# Patient Record
Sex: MALE | Race: WHITE | NOT HISPANIC OR LATINO | URBAN - METROPOLITAN AREA
[De-identification: names, ages, dates, MRNs, and addresses within clinical notes are randomized per-mention and may not be internally consistent; named-entity substitution may affect disease eponyms.]

---

## 2017-06-08 ENCOUNTER — RESOLUTE PROFESSIONAL BILLING HOSPITAL PROF FEE (OUTPATIENT)
Dept: PHYSICAL MEDICINE AND REHAB | Facility: REHABILITATION | Age: 22
End: 2017-06-08
Payer: COMMERCIAL

## 2017-06-08 ENCOUNTER — HOSPITAL ENCOUNTER (INPATIENT)
Facility: REHABILITATION | Age: 22
LOS: 13 days | DRG: 057 | End: 2017-06-21
Attending: PHYSICAL MEDICINE & REHABILITATION | Admitting: PHYSICAL MEDICINE & REHABILITATION
Payer: COMMERCIAL

## 2017-06-08 PROBLEM — S06.4XAA EPIDURAL HEMATOMA (HCC): Status: ACTIVE | Noted: 2017-06-08

## 2017-06-08 PROCEDURE — 94760 N-INVAS EAR/PLS OXIMETRY 1: CPT

## 2017-06-08 PROCEDURE — 99223 1ST HOSP IP/OBS HIGH 75: CPT | Performed by: PHYSICAL MEDICINE & REHABILITATION

## 2017-06-08 PROCEDURE — 770010 HCHG ROOM/CARE - REHAB SEMI PRIVAT*

## 2017-06-08 PROCEDURE — A9270 NON-COVERED ITEM OR SERVICE: HCPCS | Performed by: PHYSICAL MEDICINE & REHABILITATION

## 2017-06-08 PROCEDURE — 700102 HCHG RX REV CODE 250 W/ 637 OVERRIDE(OP): Performed by: PHYSICAL MEDICINE & REHABILITATION

## 2017-06-08 RX ORDER — ONDANSETRON 2 MG/ML
4 INJECTION INTRAMUSCULAR; INTRAVENOUS 4 TIMES DAILY PRN
Status: DISCONTINUED | OUTPATIENT
Start: 2017-06-08 | End: 2017-06-21 | Stop reason: HOSPADM

## 2017-06-08 RX ORDER — ONDANSETRON 4 MG/1
4 TABLET, ORALLY DISINTEGRATING ORAL 4 TIMES DAILY PRN
Status: DISCONTINUED | OUTPATIENT
Start: 2017-06-08 | End: 2017-06-21 | Stop reason: HOSPADM

## 2017-06-08 RX ORDER — LEVETIRACETAM 500 MG/1
TABLET ORAL
Status: COMPLETED
Start: 2017-06-08 | End: 2017-06-08

## 2017-06-08 RX ORDER — OXYCODONE AND ACETAMINOPHEN 10; 325 MG/1; MG/1
1 TABLET ORAL EVERY 6 HOURS PRN
Status: DISCONTINUED | OUTPATIENT
Start: 2017-06-08 | End: 2017-06-21 | Stop reason: HOSPADM

## 2017-06-08 RX ORDER — LEVETIRACETAM 100 MG/ML
SOLUTION ORAL 2 TIMES DAILY
Status: ON HOLD | COMMUNITY
End: 2017-06-21

## 2017-06-08 RX ORDER — LEVETIRACETAM 500 MG/1
1000 TABLET ORAL EVERY 12 HOURS
Status: DISCONTINUED | OUTPATIENT
Start: 2017-06-08 | End: 2017-06-21 | Stop reason: HOSPADM

## 2017-06-08 RX ORDER — AMOXICILLIN 250 MG
1 CAPSULE ORAL EVERY EVENING
Status: DISCONTINUED | OUTPATIENT
Start: 2017-06-08 | End: 2017-06-21 | Stop reason: HOSPADM

## 2017-06-08 RX ORDER — DIVALPROEX SODIUM 500 MG/1
500 TABLET, DELAYED RELEASE ORAL
Status: DISCONTINUED | OUTPATIENT
Start: 2017-06-08 | End: 2017-06-21 | Stop reason: HOSPADM

## 2017-06-08 RX ORDER — DOCUSATE SODIUM 100 MG/1
100 CAPSULE, LIQUID FILLED ORAL DAILY
Status: DISCONTINUED | OUTPATIENT
Start: 2017-06-08 | End: 2017-06-21 | Stop reason: HOSPADM

## 2017-06-08 RX ORDER — DIVALPROEX SODIUM 125 MG/1
125 CAPSULE, COATED PELLETS ORAL 2 TIMES DAILY
Status: ON HOLD | COMMUNITY
End: 2017-06-21

## 2017-06-08 RX ORDER — ENEMA 19; 7 G/133ML; G/133ML
1 ENEMA RECTAL PRN
Status: DISCONTINUED | OUTPATIENT
Start: 2017-06-08 | End: 2017-06-21 | Stop reason: HOSPADM

## 2017-06-08 RX ORDER — BISACODYL 10 MG
10 SUPPOSITORY, RECTAL RECTAL PRN
Status: DISCONTINUED | OUTPATIENT
Start: 2017-06-08 | End: 2017-06-21 | Stop reason: HOSPADM

## 2017-06-08 RX ORDER — TRAZODONE HYDROCHLORIDE 50 MG/1
50 TABLET ORAL
Status: DISCONTINUED | OUTPATIENT
Start: 2017-06-08 | End: 2017-06-21 | Stop reason: HOSPADM

## 2017-06-08 RX ORDER — ACETAMINOPHEN 325 MG/1
650 TABLET ORAL EVERY 4 HOURS PRN
Status: DISCONTINUED | OUTPATIENT
Start: 2017-06-08 | End: 2017-06-21 | Stop reason: HOSPADM

## 2017-06-08 RX ORDER — LACTULOSE 20 G/30ML
30 SOLUTION ORAL PRN
Status: DISCONTINUED | OUTPATIENT
Start: 2017-06-08 | End: 2017-06-21 | Stop reason: HOSPADM

## 2017-06-08 RX ORDER — SIMETHICONE 80 MG
80 TABLET,CHEWABLE ORAL 3 TIMES DAILY PRN
Status: DISCONTINUED | OUTPATIENT
Start: 2017-06-08 | End: 2017-06-21 | Stop reason: HOSPADM

## 2017-06-08 RX ADMIN — Medication 1 TABLET: at 20:41

## 2017-06-08 RX ADMIN — DIVALPROEX SODIUM 500 MG: 500 TABLET, DELAYED RELEASE ORAL at 20:41

## 2017-06-08 RX ADMIN — OXYCODONE HYDROCHLORIDE AND ACETAMINOPHEN 1 TABLET: 10; 325 TABLET ORAL at 20:45

## 2017-06-08 RX ADMIN — LEVETIRACETAM 1000 MG: 500 TABLET, FILM COATED ORAL at 20:53

## 2017-06-08 RX ADMIN — TRAZODONE HYDROCHLORIDE 50 MG: 50 TABLET ORAL at 20:45

## 2017-06-08 ASSESSMENT — COPD QUESTIONNAIRES
DURING THE PAST 4 WEEKS HOW MUCH DID YOU FEEL SHORT OF BREATH: NONE/LITTLE OF THE TIME
COPD SCREENING SCORE: 0
DO YOU EVER COUGH UP ANY MUCUS OR PHLEGM?: NO/ONLY WITH OCCASIONAL COLDS OR INFECTIONS
HAVE YOU SMOKED AT LEAST 100 CIGARETTES IN YOUR ENTIRE LIFE: NO/DON'T KNOW

## 2017-06-08 ASSESSMENT — PAIN SCALES - GENERAL: PAINLEVEL_OUTOF10: 4

## 2017-06-08 ASSESSMENT — LIFESTYLE VARIABLES
ALCOHOL_USE: NO
EVER_SMOKED: NEVER
EVER_SMOKED: NEVER
DO YOU DRINK ALCOHOL: NO

## 2017-06-08 NOTE — PROGRESS NOTES
1502 Pt arrived at [facility] from [facility] via hospital transport. Dom to follow for diagnosis of s/p epidural hematoma. Initial assessment initiated. Pt oriented to room and facility routine and safety measures; pt education binder provided and discussed. Pt A/O x 4, continent of bowel and bladder. Min assist for transfers. All wounds photographed and documented; photos uploaded to Media Manager. Pt denies pain or discomfort at this time. Pt positioned for comfort in bed. Call light within reach, safety measures in place. Will continue to monitor.
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
## 2017-06-08 NOTE — IP AVS SNAPSHOT
" Home Care Instructions                                                                                                                  Name:Meir Byrd  Medical Record Number:[id number]  CSN: [id number]    YOB: 1995   Age: 21 y.o.  Sex: male  HT:1.689 m (5' 6.5\") WT: 55.2 kg (121 lb 11.1 oz)          Admit Date: 6/8/2017     Discharge Date:   Today's Date: 6/21/2017  Attending Doctor:  Porfirio Fernandes M.D.                  Allergies:  Rashi flavor and Sulfa drugs            Discharge Instructions           [facility] NURSING DISCHARGE INSTRUCTIONS    Blood Pressure: 104/59 mmHg  Weight: 55.2 kg (121 lb 11.1 oz)  Nursing recommendations for Meir Byrd at time of discharge are as follows:  Client verbalized understanding of all discharge instructions and prescriptions.     Review all your home medications and newly ordered medications with your doctor and/or pharmacist. Follow medication instructions as directed by your doctor and/or pharmacist.    Pain Management:   Discharge Pain Medication Instructions:  Comfort Goal: 0, Comfort at Rest, Comfort with Movement, Perform Activity, Sleep Comfortably, Stay Alert  Notify your primary care provider if pain is unrelieved with these measures, if the pain is new, or increased in intensity.    Discharge Skin Characteristics:  Warm, dry, intact  Discharge Skin Exam:  Healing scalp incision with few scabs present    Skin / Wound Care Instructions: Please contact your primary care physician for any change in skin integrity.  If You Have Surgical Incisions / Wounds:  Monitor surgical site(s) for signs of increased swelling, redness or symptoms of drainage from the site or fever as this could indicate signs and symptoms of infection. If these symptoms are noted, notifiy your primary care provider.      Discharge Safety Instructions: Should Have ADULT SUPERVISION     Do not participate in vigorous physical activity or play sports until cleared by Dr" Leppla.  Do not run/jog, jump rope, ride a bicycle, roller skate, skate board, do push ups, calisthenics, or other activities that could add stress to youyr head incision.    Discharge Safety Concerns: Other (Comments), Weakness (Hx of seizures)  The interdisciplinary team has made recommendation that you should have adult supervision in the house due to weakness and Hx of seizures.  Anti-embolic stockings are not required to increase circulation to the lower extremities.    Discharge Diet: Regular     Discharge Liquids: Thin Liquids  Discharge Bowel Function: Continent  Please contact your primary care physician for any changes in bowel habits.  Discharge Bowel Program:    Discharge Bladder Function: Continent  Discharge Urinary Devices:        Nursing Discharge Plan:   Influenza Vaccine Indication: Not indicated: Previously immunized this influenza season and > 8 years of age    Case Management Discharge Instructions:   Discharge Location: Home with Outpatient Services  Agency Name/Address/Phone: [facility] [phone].   [facility] [address] [place]  [address].  Please call to schedule follow up appointments with Physical Therapy and Speech Therapy.    Home Health:    Outpatient Services:    DME Provider/Phone: None.    Medical Equipment Ordered:    Prescription Faxed to: [facility] [address]         Suicidal Feelings: How to Help Yourself  Suicide is the taking of one's own life. If you feel as though life is getting too tough to handle and are thinking about suicide, get help right away. To get help:  · Call your local emergency services (911 in the [place]  · Call a suicide hotline to speak with a trained counselor who understands how you are feeling. The following is a list of suicide hotlines in the [place]. For a list of hotlines in [place], visit www.suicide.org/hotlines/international/xzhtvg-rfvjasd-gilmcgzo.html.  ·  7-563-926-TALK  (1-629.185.5681).  ·  3-211-OUZTJOG (1-500.864.6792).  ·  1-359.124.6382. This is a hotline for Bulgarian speakers.  ·  9-215-819-4TTY (1-427.598.2378). This is a hotline for TTY users.  ·  5-432-2-U-ZACHARY (1-465.877.9223). This is a hotline for lesbian, bolanos, bisexual, transgender, or questioning youth.  · Contact a crisis center or a local suicide prevention center. To find a crisis center or suicide prevention center:  · Call your local hospital, clinic, community service organization, mental health center, social service provider, or health department. Ask for assistance in connecting to a crisis center.  · Visit www.suicidepreventionlifeline.org/getinvolved/ for a list of crisis centers in the [place], or visit www.suicidepreZvooq.ca/uqbvowou-qzogu-moaurlx/find-a-crisis-centre for a list of centers in [place].  · Visit the following websites:  ·  National Suicide Prevention Lifeline: www.suicidepreventionlifeline.org  ·  Hopeline: www.hopeline.Matchpoint  ·  American Foundation for Suicide Prevention: www.afsp.org  ·  The [place] Project (for lesbian, bolanos, bisexual, transgender, or questioning youth): www.thetrevorproject.org  HOW CAN I HELP MYSELF FEEL BETTER?  · Promise yourself that you will not do anything drastic when you have suicidal feelings. Remember, there is hope. Many people have gotten through suicidal thoughts and feelings, and you will, too. You may have gotten through them before, and this proves that you can get through them again.  · Let family, friends, teachers, or counselors know how you are feeling. Try not to isolate yourself from those who care about you. Remember, they will want to help you. Talk with someone every day, even if you do not feel sociable. Face-to-face conversation is best.  · Call a mental health professional and see one regularly.  · Visit your primary health care provider every year.  · Eat a well-balanced diet, and space your meals so you eat regularly.  · Get  plenty of rest.  · Avoid alcohol and drugs, and remove them from your home. They will only make you feel worse.  · If you are thinking of taking a lot of medicine, give your medicine to someone who can give it to you one day at a time. If you are on antidepressants and are concerned you will overdose, let your health care provider know so he or she can give you safer medicines. Ask your mental health professional about the possible side effects of any medicines you are taking.  · Remove weapons, poisons, knives, and anything else that could harm you from your home.  · Try to stick to routines. Follow a schedule every day. Put self-care on your schedule.  · Make a list of realistic goals, and cross them off when you achieve them. Accomplishments give a sense of worth.  · Wait until you are feeling better before doing the things you find difficult or unpleasant.  · Exercise if you are able. You will feel better if you exercise for even a half hour each day.  · Go out in the sun or into nature. This will help you recover from depression faster. If you have a favorite place to walk, go there.  · Do the things that have always given you pleasure. Play your favorite music, read a good book, paint a picture, play your favorite instrument, or do anything else that takes your mind off your depression if it is safe to do.  · Keep your living space well lit.  · When you are feeling well, write yourself a letter about tips and support that you can read when you are not feeling well.  · Remember that life's difficulties can be sorted out with help. Conditions can be treated. You can work on thoughts and strategies that serve you well.     This information is not intended to replace advice given to you by your health care provider. Make sure you discuss any questions you have with your health care provider.     Seizure, Adult  A seizure is abnormal electrical activity in the brain. Seizures usually last from 30 seconds to 2  minutes. There are various types of seizures.  Before a seizure, you may have a warning sensation (aura) that a seizure is about to occur. An aura may include the following symptoms:   · Fear or anxiety.  · Nausea.  · Feeling like the room is spinning (vertigo).  · Vision changes, such as seeing flashing lights or spots.  Common symptoms during a seizure include:  · A change in attention or behavior (altered mental status).  · Convulsions with rhythmic jerking movements.  · Drooling.  · Rapid eye movements.  · Grunting.  · Loss of bladder and bowel control.  · Bitter taste in the mouth.  · Tongue biting.  After a seizure, you may feel confused and sleepy. You may also have an injury resulting from convulsions during the seizure.  HOME CARE INSTRUCTIONS   · If you are given medicines, take them exactly as prescribed by your health care provider.  · Keep all follow-up appointments as directed by your health care provider.  · Do not swim or drive or engage in risky activity during which a seizure could cause further injury to you or others until your health care provider says it is OK.  · Get adequate rest.  · Teach friends and family what to do if you have a seizure. They should:  · Lay you on the ground to prevent a fall.  · Put a cushion under your head.  · Loosen any tight clothing around your neck.  ·   · Turn you on your side. If vomiting occurs, this helps keep your airway clear.  · Stay with you until you recover.  · Know whether or not you need emergency care.  SEEK IMMEDIATE MEDICAL CARE IF:  · The seizure lasts longer than 5 minutes.  · The seizure is severe or you do not wake up immediately after the seizure.  · You have an altered mental status after the seizure.  · You are having more frequent or worsening seizures.  Someone should drive you to the emergency department or call local emergency services (911 in [place]  MAKE SURE YOU:  · Understand these instructions.  · Will watch your condition.  · Will  get help right away if you are not doing well or get worse.     This information is not intended to replace advice given to you by your health care provider. Make sure you discuss any questions you have with your health care provider.    Head Injury, Adult  You have a head injury. Headaches and throwing up (vomiting) are common after a head injury. It should be easy to wake up from sleeping. Sometimes you must stay in the hospital. Most problems happen within the first 24 hours. Side effects may occur up to 7-10 days after the injury.   WHAT ARE THE TYPES OF HEAD INJURIES?  Head injuries can be as minor as a bump. Some head injuries can be more severe. More severe head injuries include:  · A jarring injury to the brain (concussion).  · A bruise of the brain (contusion). This mean there is bleeding in the brain that can cause swelling.  · A cracked skull (skull fracture).  · Bleeding in the brain that collects, clots, and forms a bump (hematoma).  WHEN SHOULD I GET HELP RIGHT AWAY?   · You are confused or sleepy.  · You cannot be woken up.  · You feel sick to your stomach (nauseous) or keep throwing up (vomiting).  · Your dizziness or unsteadiness is getting worse.  · You have very bad, lasting headaches that are not helped by medicine. Take medicines only as told by your doctor.  · You cannot use your arms or legs like normal.  · You cannot walk.  · You notice changes in the black spots in the center of the colored part of your eye (pupil).  · You have clear or bloody fluid coming from your nose or ears.  · You have trouble seeing.  During the next 24 hours after the injury, you must stay with someone who can watch you. This person should get help right away (call 911 in the [place] if you start to shake and are not able to control it (have seizures), you pass out, or you are unable to wake up.  HOW CAN I PREVENT A HEAD INJURY IN THE FUTURE?  · Wear seat belts.  · Wear a helmet while bike riding and playing sports  like football.  · Stay away from dangerous activities around the house.  WHEN CAN I RETURN TO NORMAL ACTIVITIES AND ATHLETICS?  See your doctor before doing these activities. You should not do normal activities or play contact sports until 1 week after the following symptoms have stopped:  · Headache that does not go away.  · Dizziness.  · Poor attention.  · Confusion.  · Memory problems.  · Sickness to your stomach or throwing up.  · Tiredness.  · Fussiness.  · Bothered by bright lights or loud noises.  · Anxiousness or depression.  · Restless sleep.  MAKE SURE YOU:   · Understand these instructions.  · Will watch your condition.  · Will get help right away if you are not doing well or get worse.     This information is not intended to replace advice given to you by your health care provider. Make sure you discuss any questions you have with your health care provider.     Document Released: 11/30/2009 Document Revised: 01/08/2016 Document Reviewed: 08/25/2014  [employer] Interactive Patient Education ©2016 [employer]        Fall Prevention in the Home     Falls can cause injuries. They can happen to people of all ages. There are many things you can do to make your home safe and to help prevent falls.   WHAT CAN I DO ON THE OUTSIDE OF MY HOME?  · Regularly fix the edges of walkways and driveways and fix any cracks.  · Remove anything that might make you trip as you walk through a door, such as a raised step or threshold.  · Trim any bushes or trees on the path to your home.  · Use bright outdoor lighting.  · Clear any walking paths of anything that might make someone trip, such as rocks or tools.  · Regularly check to see if handrails are loose or broken. Make sure that both sides of any steps have handrails.  · Any raised decks and porches should have guardrails on the edges.  · Have any leaves, snow, or ice cleared regularly.  · Use sand or salt on walking paths during winter.  · Clean up any spills in your  garage right away. This includes oil or grease spills.  WHAT CAN I DO IN THE BATHROOM?   · Use night lights.  · Install grab bars by the toilet and in the tub and shower. Do not use towel bars as grab bars.  · Use non-skid mats or decals in the tub or shower.  · If you need to sit down in the shower, use a plastic, non-slip stool.  · Keep the floor dry. Clean up any water that spills on the floor as soon as it happens.  · Remove soap buildup in the tub or shower regularly.  · Attach bath mats securely with double-sided non-slip rug tape.  · Do not have throw rugs and other things on the floor that can make you trip.  WHAT CAN I DO IN THE BEDROOM?  · Use night lights.  · Make sure that you have a light by your bed that is easy to reach.  · Do not use any sheets or blankets that are too big for your bed. They should not hang down onto the floor.  · Have a firm chair that has side arms. You can use this for support while you get dressed.  · Do not have throw rugs and other things on the floor that can make you trip.  WHAT CAN I DO IN THE KITCHEN?  · Clean up any spills right away.  · Avoid walking on wet floors.  · Keep items that you use a lot in easy-to-reach places.  · If you need to reach something above you, use a strong step stool that has a grab bar.  · Keep electrical cords out of the way.  · Do not use floor polish or wax that makes floors slippery. If you must use wax, use non-skid floor wax.  · Do not have throw rugs and other things on the floor that can make you trip.  WHAT CAN I DO WITH MY STAIRS?  · Do not leave any items on the stairs.  · Make sure that there are handrails on both sides of the stairs and use them. Fix handrails that are broken or loose. Make sure that handrails are as long as the stairways.  · Check any carpeting to make sure that it is firmly attached to the stairs. Fix any carpet that is loose or worn.  · Avoid having throw rugs at the top or bottom of the stairs. If you do have throw  rugs, attach them to the floor with carpet tape.  · Make sure that you have a light switch at the top of the stairs and the bottom of the stairs. If you do not have them, ask someone to add them for you.  WHAT ELSE CAN I DO TO HELP PREVENT FALLS?  · Wear shoes that:  ¨ Do not have high heels.  ¨ Have rubber bottoms.  ¨ Are comfortable and fit you well.  ¨ Are closed at the toe. Do not wear sandals.  · If you use a stepladder:  ¨ Make sure that it is fully opened. Do not climb a closed stepladder.  ¨ Make sure that both sides of the stepladder are locked into place.  ¨ Ask someone to hold it for you, if possible.  · Clearly comfort and make sure that you can see:  ¨ Any grab bars or handrails.  ¨ First and last steps.  ¨ Where the edge of each step is.  · Use tools that help you move around (mobility aids) if they are needed. These include:  ¨ Canes.  ¨ Walkers.  ¨ Scooters.  ¨ Crutches.  · Turn on the lights when you go into a dark area. Replace any light bulbs as soon as they burn out.  · Set up your furniture so you have a clear path. Avoid moving your furniture around.  · If any of your floors are uneven, fix them.  · If there are any pets around you, be aware of where they are.  · Review your medicines with your doctor. Some medicines can make you feel dizzy. This can increase your chance of falling.  Ask your doctor what other things that you can do to help prevent falls.     This information is not intended to replace advice given to you by your health care provider. Make sure you discuss any questions you have with your health care provider.          Infection Control in the Home  If you have an infection or you are taking care of someone who has an infection, it is important to know how to keep the infection from spreading. Follow these guidelines to help stop the spread of infection, and talk to your health care provider.  HOW ARE INFECTIONS SPREAD?  In order for an infection to spread, the following must be  present:  · A germ. This may be a virus, bacteria, fungus, or parasite.  · A place for the germ to live. This may be:  · On or in a person, animal, plant, or food.  · In soil or water.  · On surfaces, such as a door handle.  · A susceptible host. This is a person or animal who does not have resistance (immunity) to the germ.  · A way for the germ to enter the host. This may occur by:  · Direct contact. This may happen by making contact--such as shaking hands or hugging--with an infected person or animal. Some germs can also travel through the air and spread to you if an infected person coughs or sneezes on you or near you.  · Indirect contact. This is when the germ enters the host through contact with an infected object. Examples include eating contaminated food, drinking contaminated water, or touching a contaminated surface with your hands and then touching your face, nose, or mouth soon after that.  HOW CAN I HELP TO PREVENT INFECTION FROM SPREADING?  There are several things that you can do to help prevent infection from spreading.  Hand Washing  It is very important to wash your hands correctly, following these steps:  · Wet your hands with clean, running water.  · Apply soap to your hands. Liquid soap is better than bar soap.  · Rub your hands together quickly to create lather.  · Keep rubbing your hands together for at least 20 seconds. Thoroughly scrub all parts of your hands, including under your fingernails and between your fingers.  · Rinse your hands with clean, running water until all of the soap is gone.  · Dry your hands with an air dryer or a clean paper or cloth towel, or let your hands air-dry. Do not use your clothing or a soiled towel to dry your hands.  · If you are in a public restroom, use your towel to turn off the water faucet and to open the bathroom door.  Make sure to wash your hands:  · Before:  · Visiting a baby or anyone with a weakened or lowered defense (immune) system.  · Putting in  and taking out any contact lenses.  · After:  · Working or playing outside.  · Touching an animal or its toys or leash.  · Handling livestock.  · Using the bathroom or helping a child or adult to use the bathroom.  · Using household  or toxic chemicals.  · Touching or taking out the garbage.  · Touching anything dirty around your home.  · Handling soiled clothes or rags.  · Taking care of a sick child. This includes touching used tissues, toys, and clothes.  · Sneezing, coughing, or blowing your nose.  · Using public transportation.  · Shaking hands.  · Using a phone, including your mobile phone.  · Touching money.  · Before and after:  · Preparing food.  · Preparing a bottle for a baby.  · Feeding a baby or a young child.  · Eating.  · Visiting or taking care of someone who is sick.  · Changing a diaper.  · Changing a bandage (dressing) or taking care of an injury or wound.  · Giving or taking medicine.  Taking Care of Your Home  · Make sure that you have enough cleaning supplies at all times. These include:  · Disinfectants.  · Reusable cleaning cloths. Wash these after each use.  · Paper towels.  · Utility gloves. Replace your gloves if they are cracked or torn or if they start to peel.  · Use bleach safely. Never mix it with other cleaning products, especially those that contain ammonia. This mixture can create a dangerous gas that may be deadly.  · Take care of your cleaning supplies. Toilet brushes, mops, and sponges can breed germs. Soak them in bleach and water for 5 minutes after each use.  · Do not pour used mop water down the sink. Pour it down the toilet instead.  · Maintain proper ventilation in your home.  · If you have a pet, ensure that your pet stays clean. Do not let people with weak immune systems touch bird droppings, fish tank water, or a litter box.  · If you have a cat, be sure to change the litter every day.  · In the bathroom, make sure you:  · Provide liquid soap.  · Change towels  and washcloths frequently. Avoid sharing towels and washcloths.  · Change toothbrushes often and store them separately in a clean, dry place.  · Disinfect the toilet.  · Clean the tub, shower, and sink with standard cleaning products.    · Mop the floor with a standard .  · Do not share personal items, such as razors, toothbrushes, drinking glasses, deodorant, rob, brushes, towels, and washcloths.    · In the kitchen, make sure you:  · Store food carefully.  · Refrigerate leftovers promptly in covered containers.  · Throw out stale or spoiled food.  · Clean the inside of your refrigerator each week.  · Keep your refrigerator set at 40°F (4°C) or less, and set your freezer at 0°F (-18°C) or less.  · Thaw foods in the refrigerator or microwave, not at room temperature.  · Serve foods at the proper temperature. Do not eat raw meat. Make sure it is cooked to the appropriate temperature. Cook eggs until they are firm.  · Wash fruits and vegetables under running water.  · Use separate cutting boards, plates, and utensils for raw foods and cooked foods.  · Keep work surfaces clean.  · Use a clean spoon each time you sample food while cooking.  · Wash your dishes in hot, soapy water. Air-dry your dishes or use a .  · Do not share forks, cups, or spoons during meals.  · Wear gloves if laundry is visibly soiled.  · Change linens each week or whenever they are soiled.  · Do not shake soiled linens. Doing that may send germs into the air. Put dressings, sanitary or incontinence pads, diapers, and gloves in plastic garbage bags for disposal.     This information is not intended to replace advice given to you by your health care provider. Make sure you discuss any questions you have with your health care provider.         Physical Therapy Discharge Instructions for Meir Ledezmajackie  6/21/2017    Level of Assist Required for Ambulation: No Assist on Flat Surfaces, No Assist on Curbs, No Assist on Stairs (Supervision  for Safety with streets and community ambulation)  Distance Patient May Ambulate: 1500+ feet  Device Recommended for Ambulation: None  Level of Assist Required for Transfers: Requires No Assist  Device Recommended for Transfers: None  Home Exercise Program: None Issued  Prosthesis / Orthosis Recommendation / Location: No Prosthesis or Orthosis Recommended    Meir, it has been a pleasure working with you over the past couple weeks.  Please continue with your outpatient physical therapy for increasing balance and activities that may increase the forces and/or blood pressure in your head.  You have done a great job and we will miss you!  --DEBRA Bello            Follow-up Information     1. Follow up with Francisco Larry M.D. On 6/22/2017.    Specialty:  Neurosurgery    Why:  Thursday @ 1:00pm.      Contact information    [address]  [place] [address]  [phone]          2. Follow up with James Peguero  On 7/10/2017.    Why:  Monday @ 9:50am/  Records will be sent to new primary care's office.     Contact information    [address] [place] [address] [phone]   fax:  [phone]         3. Follow up with Aaron White .    Why:  Please call to schedule follow-up.      Contact information    [address]  [place]  [address] [phone]        4. Follow up with Brain Valdes .    Why:  Epileptologist    Contact information    [address] [place]  [address] [phone]        5. Follow up with Wendy Leslie.    Why:  Epileptologist     Contact information    [address] [place] [address].  [place]  [address] [phone]          Discharge Medication Instructions:    Below are the medications your physician expects you to take upon discharge:    Review all your home medications and newly ordered medications with your doctor and/or pharmacist. Follow medication instructions as directed by your doctor and/or pharmacist.    Please keep your medication list with you and share with your  physician.               Medication List      START taking these medications        Instructions    Morning Afternoon Evening Bedtime    divalproex 500 MG Tbec   Last time this was given:  500 mg on 6/20/2017  8:01 PM   Commonly known as:  DEPAKOTE   Replaces:  Divalproex Sodium 125 MG Csdr   Next Dose Due:  Wednesday, June 21, 2017 at bedtime        Take 1 Tab by mouth every bedtime.   Dose:  500 mg                        levetiracetam 1000 MG tablet   Last time this was given:  1,000 mg on 6/21/2017  8:43 AM   Commonly known as:  KEPPRA   Replaces:  levetiracetam 100 MG/ML Soln   Next Dose Due:  Wednesday, June 21, 2017 at bedtime        Take 1 Tab by mouth every 12 hours.   Dose:  1000 mg                          STOP taking these medications     Divalproex Sodium 125 MG Csdr   Commonly known as:  DEPAKOTE   Replaced by:  divalproex 500 MG Tbec               levetiracetam 100 MG/ML Soln   Commonly known as:  KEPPRA   Replaced by:  levetiracetam 1000 MG tablet                    Where to Get Your Medications      These medications were sent to [facility] [address] [place] [address] - [address] [facility]  [address] [facility] [address]    Hours:  24-hours Phone:  [phone]    - divalproex 500 MG Tbec  - levetiracetam 1000 MG tablet            Instructions           Diet / Nutrition:    Follow any diet instructions given to you by your doctor or the dietician, including how much salt (sodium) you are allowed each day.    If you are overweight, talk to your doctor about a weight reduction plan.    Activity:    Remain physically active following your doctor's instructions about exercise and activity.    Rest often.     Any time you become even a little tired or short of breath, SIT DOWN and rest.    Worsening Symptoms:    Report any of the following signs and symptoms to the doctor's office immediately:    *Pain of jaw, arm, or neck  *Chest pain not relieved by medication                                *Dizziness or loss of consciousness  *Difficulty breathing even when at rest   *More tired than usual                                       *Bleeding drainage or swelling of surgical site  *Swelling of feet, ankles, legs or stomach                 *Fever (>100ºF)  *Pink or blood tinged sputum  *Weight gain (3lbs/day or 5lbs /week)           *Shock from internal defibrillator (if applicable)  *Palpitations or irregular heartbeats                *Cool and/or numb extremities    Stroke Awareness    Common Risk Factors for Stroke include:    Age  Atrial Fibrillation  Carotid Artery Stenosis  Diabetes Mellitus  Excessive alcohol consumption  High blood pressure  Overweight   Physical inactivity  Smoking    Warning signs and symptoms of a stroke include:    *Sudden numbness or weakness of the face, arm or leg (especially on one side of the body).  *Sudden confusion, trouble speaking or understanding.  *Sudden trouble seeing in one or both eyes.  *Sudden trouble walking, dizziness, loss of balance or coordination.Sudden severe headache with no known cause.    It is very important to get treatment quickly when a stroke occurs. If you experience any of the above warning signs, call 911 immediately.                   Disclaimer         Quit Smoking / Tobacco Use:    I understand the use of any tobacco products increases my chance of suffering from future heart disease or stroke and could cause other illnesses which may shorten my life. Quitting the use of tobacco products is the single most important thing I can do to improve my health. For further information on smoking / tobacco cessation call a Toll Free Quit Line at 1-846.612.8024 (*[employer] [place]) or 1-134.421.6096 (American [employer] Association) or you can access the web based program at www.lungusa.org.    [place] Tobacco Users Help Line:  (882) 709-6887       Toll Free: 1-765.454.7088  Quit Tobacco Program [facility]  (764) 866-7723    Crisis Hotline:    [facility] Crisis Hotline:  9-908-NBVBHOH or 1-593.997.5957    [place] Crisis Hotline:    1-956.907.7620 or 350-180-9715    Discharge Survey:   Thank you for choosing [facility]. We hope we did everything we could to make your hospital stay a pleasant one. You may be receiving a phone survey and we would appreciate your time and participation in answering the questions. Your input is very valuable to us in our efforts to improve our service to our patients and their families.        My signature on this form indicates that:    1. I have reviewed and understand the above information.  2. My questions regarding this information have been answered to my satisfaction.  3. I have formulated a plan with my discharge nurse to obtain my prescribed medications for home.                  Disclaimer         __________________________________                     __________       ________                       Patient Signature                                                 Date                    Time

## 2017-06-08 NOTE — FLOWSHEET NOTE
06/08/17 1545   Type of Assessment   Assessment Yes   Patient History   Pulmonary Diagnosis no   Surgical Procedures L. Crainotomy 5/27/27   Home O2 No   Home Treatments/Frequency No   COPD Risk Screening   Do you have a history of COPD? No   COPD Population Screener   During the past 4 weeks, how much did you feel short of breath? 0   Do you ever cough up any mucus or phlegm? 0   In the past 12 months, you do less than you used to because of your breathing problems 0   Have you smoked at least 100 cigarettes in your entire life? 0   How old are you? 0   COPD Screening Score 0   Smoking History   Have you Ever Smoked Never   Level Of Consciousness   Level of Consciousness Alert   Respiratory WDL   Respiratory (WDL) X   Chest Exam   Work Of Breathing / Effort Mild   Respiration 17   Pulse 97   Breath Sounds   RUL Breath Sounds Clear   RML Breath Sounds Clear   RLL Breath Sounds Clear   SHELLI Breath Sounds Clear   LLL Breath Sounds Clear   Secretions   Cough Non Productive   Oximetry   #Pulse Oximetry (Single Determination) Yes   Oxygen   Home O2 Use Prior To Admission? No   Pulse Oximetry 97 %   O2 (LPM) 0   O2 (FiO2) 21   Protocol Pathways   Protocol Pathways Yes   O2 Protocol   O2 Protocol Indications Room Air SpO2 Less Than 90%   O2 Protocol Goals/Outcome Normoxemia on Oxygen, SpO2 greater than 90%

## 2017-06-09 PROBLEM — R41.89 COGNITIVE AND BEHAVIORAL CHANGES: Status: ACTIVE | Noted: 2017-06-09

## 2017-06-09 PROBLEM — R46.89 COGNITIVE AND BEHAVIORAL CHANGES: Status: ACTIVE | Noted: 2017-06-09

## 2017-06-09 PROBLEM — R47.01 APHASIA: Status: ACTIVE | Noted: 2017-06-09

## 2017-06-09 PROBLEM — I63.9 ISCHEMIC STROKE (HCC): Status: ACTIVE | Noted: 2017-06-09

## 2017-06-09 PROBLEM — Z78.9 IMPAIRED MOBILITY AND ADLS: Status: ACTIVE | Noted: 2017-06-09

## 2017-06-09 PROBLEM — Z74.09 IMPAIRED MOBILITY AND ADLS: Status: ACTIVE | Noted: 2017-06-09

## 2017-06-09 PROBLEM — R48.8 ANOMIA: Status: ACTIVE | Noted: 2017-06-09

## 2017-06-09 PROBLEM — Z87.820 HISTORY OF TRAUMATIC BRAIN INJURY: Status: ACTIVE | Noted: 2017-06-09

## 2017-06-09 LAB
ALBUMIN SERPL BCP-MCNC: 3.9 G/DL (ref 3.2–4.9)
ALBUMIN/GLOB SERPL: 1.1 G/DL
ALP SERPL-CCNC: 76 U/L (ref 30–99)
ALT SERPL-CCNC: 16 U/L (ref 2–50)
ANION GAP SERPL CALC-SCNC: 9 MMOL/L (ref 0–11.9)
APPEARANCE UR: CLEAR
AST SERPL-CCNC: 15 U/L (ref 12–45)
BASOPHILS # BLD AUTO: 0.6 % (ref 0–1.8)
BASOPHILS # BLD: 0.04 K/UL (ref 0–0.12)
BILIRUB CONJ SERPL-MCNC: <0.1 MG/DL (ref 0.1–0.5)
BILIRUB INDIRECT SERPL-MCNC: NORMAL MG/DL (ref 0–1)
BILIRUB SERPL-MCNC: 0.3 MG/DL (ref 0.1–1.5)
BILIRUB UR QL STRIP.AUTO: NEGATIVE
BUN SERPL-MCNC: 12 MG/DL (ref 8–22)
CALCIUM SERPL-MCNC: 9 MG/DL (ref 8.5–10.5)
CHLORIDE SERPL-SCNC: 104 MMOL/L (ref 96–112)
CO2 SERPL-SCNC: 28 MMOL/L (ref 20–33)
COLOR UR: YELLOW
CREAT SERPL-MCNC: 0.6 MG/DL (ref 0.5–1.4)
EOSINOPHIL # BLD AUTO: 0.07 K/UL (ref 0–0.51)
EOSINOPHIL NFR BLD: 1 % (ref 0–6.9)
ERYTHROCYTE [DISTWIDTH] IN BLOOD BY AUTOMATED COUNT: 44.4 FL (ref 35.9–50)
GFR SERPL CREATININE-BSD FRML MDRD: >60 ML/MIN/1.73 M 2
GLOBULIN SER CALC-MCNC: 3.4 G/DL (ref 1.9–3.5)
GLUCOSE SERPL-MCNC: 106 MG/DL (ref 65–99)
GLUCOSE UR STRIP.AUTO-MCNC: NEGATIVE MG/DL
HCT VFR BLD AUTO: 32.6 % (ref 42–52)
HGB BLD-MCNC: 10.6 G/DL (ref 14–18)
IMM GRANULOCYTES # BLD AUTO: 0.06 K/UL (ref 0–0.11)
IMM GRANULOCYTES NFR BLD AUTO: 0.9 % (ref 0–0.9)
KETONES UR STRIP.AUTO-MCNC: NEGATIVE MG/DL
LEUKOCYTE ESTERASE UR QL STRIP.AUTO: NEGATIVE
LYMPHOCYTES # BLD AUTO: 1.55 K/UL (ref 1–4.8)
LYMPHOCYTES NFR BLD: 22.1 % (ref 22–41)
MCH RBC QN AUTO: 28.9 PG (ref 27–33)
MCHC RBC AUTO-ENTMCNC: 32.5 G/DL (ref 33.7–35.3)
MCV RBC AUTO: 88.8 FL (ref 81.4–97.8)
MICRO URNS: NORMAL
MONOCYTES # BLD AUTO: 0.69 K/UL (ref 0–0.85)
MONOCYTES NFR BLD AUTO: 9.9 % (ref 0–13.4)
NEUTROPHILS # BLD AUTO: 4.59 K/UL (ref 1.82–7.42)
NEUTROPHILS NFR BLD: 65.5 % (ref 44–72)
NITRITE UR QL STRIP.AUTO: NEGATIVE
NRBC # BLD AUTO: 0 K/UL
NRBC BLD AUTO-RTO: 0 /100 WBC
PH UR STRIP.AUTO: 6 [PH]
PLATELET # BLD AUTO: 344 K/UL (ref 164–446)
PMV BLD AUTO: 10.6 FL (ref 9–12.9)
POTASSIUM SERPL-SCNC: 4.6 MMOL/L (ref 3.6–5.5)
PREALB SERPL-MCNC: 25 MG/DL (ref 18–38)
PROT SERPL-MCNC: 7.3 G/DL (ref 6–8.2)
PROT UR QL STRIP: NEGATIVE MG/DL
RBC # BLD AUTO: 3.67 M/UL (ref 4.7–6.1)
RBC UR QL AUTO: NEGATIVE
SODIUM SERPL-SCNC: 141 MMOL/L (ref 135–145)
SP GR UR STRIP.AUTO: 1.02
VALPROATE SERPL-MCNC: 33.1 UG/ML (ref 50–100)
WBC # BLD AUTO: 7 K/UL (ref 4.8–10.8)

## 2017-06-09 PROCEDURE — 97535 SELF CARE MNGMENT TRAINING: CPT

## 2017-06-09 PROCEDURE — 770010 HCHG ROOM/CARE - REHAB SEMI PRIVAT*

## 2017-06-09 PROCEDURE — 81003 URINALYSIS AUTO W/O SCOPE: CPT

## 2017-06-09 PROCEDURE — 97166 OT EVAL MOD COMPLEX 45 MIN: CPT

## 2017-06-09 PROCEDURE — 92523 SPEECH SOUND LANG COMPREHEN: CPT

## 2017-06-09 PROCEDURE — 80053 COMPREHEN METABOLIC PANEL: CPT

## 2017-06-09 PROCEDURE — 700102 HCHG RX REV CODE 250 W/ 637 OVERRIDE(OP): Performed by: PHYSICAL MEDICINE & REHABILITATION

## 2017-06-09 PROCEDURE — 92610 EVALUATE SWALLOWING FUNCTION: CPT

## 2017-06-09 PROCEDURE — 84134 ASSAY OF PREALBUMIN: CPT

## 2017-06-09 PROCEDURE — A9270 NON-COVERED ITEM OR SERVICE: HCPCS | Performed by: PHYSICAL MEDICINE & REHABILITATION

## 2017-06-09 PROCEDURE — 99232 SBSQ HOSP IP/OBS MODERATE 35: CPT | Performed by: PHYSICAL MEDICINE & REHABILITATION

## 2017-06-09 PROCEDURE — 97162 PT EVAL MOD COMPLEX 30 MIN: CPT

## 2017-06-09 PROCEDURE — 700112 HCHG RX REV CODE 229: Performed by: PHYSICAL MEDICINE & REHABILITATION

## 2017-06-09 PROCEDURE — 97530 THERAPEUTIC ACTIVITIES: CPT

## 2017-06-09 PROCEDURE — 80164 ASSAY DIPROPYLACETIC ACD TOT: CPT

## 2017-06-09 PROCEDURE — 82248 BILIRUBIN DIRECT: CPT

## 2017-06-09 PROCEDURE — 85025 COMPLETE CBC W/AUTO DIFF WBC: CPT

## 2017-06-09 RX ADMIN — DOCUSATE SODIUM 100 MG: 100 CAPSULE, LIQUID FILLED ORAL at 08:45

## 2017-06-09 RX ADMIN — Medication 1 TABLET: at 20:04

## 2017-06-09 RX ADMIN — DIVALPROEX SODIUM 500 MG: 500 TABLET, DELAYED RELEASE ORAL at 20:05

## 2017-06-09 RX ADMIN — LEVETIRACETAM 1000 MG: 500 TABLET, FILM COATED ORAL at 08:44

## 2017-06-09 RX ADMIN — LEVETIRACETAM 1000 MG: 500 TABLET, FILM COATED ORAL at 20:05

## 2017-06-09 RX ADMIN — TRAZODONE HYDROCHLORIDE 50 MG: 50 TABLET ORAL at 20:08

## 2017-06-09 ASSESSMENT — GAIT ASSESSMENTS
DEVIATION: INCREASED BASE OF SUPPORT;DECREASED HEEL STRIKE;DECREASED TOE OFF
ASSISTIVE DEVICE: OTHER (COMMENTS)
GAIT LEVEL OF ASSIST: MINIMAL ASSIST
DISTANCE (FEET): 300

## 2017-06-09 ASSESSMENT — ACTIVITIES OF DAILY LIVING (ADL): TOILETING: INDEPENDENT

## 2017-06-09 ASSESSMENT — BRIEF INTERVIEW FOR MENTAL STATUS (BIMS)
INITIAL REPETITION OF BED BLUE SOCK - FIRST ATTEMPT: NO ANSWER
ASKED TO RECALL BED: NO, COULD NOT RECALL
ASKED TO RECALL SOCK: NO, COULD NOT RECALL
WHAT YEAR IS IT: CORRECT
BIMS SUMMARY SCORE: 5
WHAT MONTH IS IT: ACCURATE WITHIN 5 DAYS
WHAT DAY OF THE WEEK IS IT: NO ANSWER
ASKED TO RECALL BLUE: NO, COULD NOT RECALL

## 2017-06-09 ASSESSMENT — PAIN SCALES - GENERAL: PAINLEVEL_OUTOF10: 2

## 2017-06-09 NOTE — REHAB-NURSING IDT TEAM NOTE
Nursing  Nursing  Diet/Nutrition:  Regular and Thin Liquids  Medication Administration:  Whole with Liquid Wash  % consumed at meals in last 24 hours:  Consumed  of meals per documentation.  Dinner:  100%   Snack schedule:  None  Supplement:  Other: none  Appetite:  Good  Fluid Intake/Output in past 24 hours:  300 ml.  Admit Weight:  Weight: 52.1 kg (114 lb 13.8 oz)  Weight Last 7 Days   [52.1 kg (114 lb 13.8 oz)] 52.1 kg (114 lb 13.8 oz) (06/08 1500)    Pain Issues:    Location:  Head (06/08 2100)  --         Severity:  Mild   Scheduled pain medications:  None     PRN pain medications used in last 24 hours:  None   Non Pharmacologic Interventions:  warm blanket, emotional support, food, music, relaxation, repositioned and rest  Effectiveness of pain management plan:  good=patient states acceptable comfort after interventions    Bowel:    Bowel Assist Initial Score:  5 - Standby Prompting/Supervision or Set-up  Bowel Assist Current Score:  5 - Standby Prompting/Supervision or Set-up  Bowl Accidents in last 7 days:   0  Last bowel movement: 06/08/17  Stool: Medium, Soft     Usual bowel pattern:  daily  Scheduled bowel medications:  docusate sodium (COLACE) and senna-docusate (PERICOLACE or SENOKOT S)   PRN bowel medications used in last 24 hours:  None  Nursing Interventions:  Increased time, PRN medication, Scheduled medication, Supervision, Verbal cueing  Effectiveness of bowel program:   good=regular, predictable, controlled emptying of bowel  Bladder:    Bladder Assist Initial Score:  5 - Standby Prompting/Supervision or Set-up  Bladder Assist Current Score:  5 - Standby Prompting/Supervision or Set-up  Bladder Accidents in last 7 days:     PVR range for past 24-48 hours: refused to be scan, went to the toilet by himself and flush without calling the staff.  Medications affecting bladder:  None    Time void schedule/voiding pattern:  no  Interventions:  Increased time,  Supervision, Verbal cueing.  Effectiveness  of bladder training:  Good=regular, predictable, emptying of bladder, patient initiates time voiding  Wound:         Patient Lines/Drains/Airways Status    Active Current Wounds     Name: Placement date: Placement time: Site: Days:    Surgical Incision  Incision Left Head 06/08/17      1                   Interventions: monitor surgical incision in the head, keep dry and clean open to air.  Effectiveness of intervention:  wound is improving according to the pt.'s father.    Sleep/wake cycle:   Average hours slept:  Sleeps 3-4 hours without waking  Sleep medication usage:  None    Patient/Family Training/Education:  Diet/Nutrition, Fall Prevention, General Self Care, Safe Transfers, Safety, Skin Care and Wound Care  Discharge Supply Recommendations:  Wound Care Supplies  Strengths: Alert and oriented, Willingly participates in therapeutic activities, Able to follow instructions, Supportive family, Pleasant and cooperative, Motivated for self care and independence and Manages pain appropriately   Barriers:   Aphasia expressive, Aphasia receptive, Generalized weakness and Limited mobility            Nursing Problems              Problem: Communication     Goal: The ability to communicate needs accurately and effectively will improve           Problem: Discharge Barriers/Planning     Goal: Patient's continuum of care needs will be met           Problem: Infection     Goal: Will remain free from infection           Problem: Knowledge Deficit     Goal: Knowledge of disease process/condition, treatment plan, diagnostic tests, and medications will improve         Goal: Knowledge of the prescribed therapeutic regimen will improve           Problem: Pain Management     Goal: Pain level will decrease to patient's comfort goal           Problem: Safety     Goal: Will remain free from injury         Goal: Will remain free from falls             Long Term Goals:   At discharge patient will be able to [place] safely and  independently at home and in the community.    Section completed by:  Margoth Jj R.N.

## 2017-06-09 NOTE — H&P
REHABILITATION HISTORY AND PHYSICAL/POST ADMISSION EVALUATION    6/8/2017  8:54 PM  Meir Byrd  [id number]  Admission  6/8/2017  3:03 PM  Reason for admission: Ischemic stroke (CMS-[facility])    HPI:  The patient is a 21 y.o. male with a past medical history of severe traumatic brain injury at age 11; now admitted for acute inpatient rehabilitation with severe functional debility after a cranioplasty which is complicated by a documented epidural hematoma and per report of the father and ischemic stroke.  On admission the patient and medical record report that the patient had a severe traumatic brain injury was 11 years old had a very good recovery was fully independent doing quite well overall per report of the father the patient was attending college doing quite well. Per report of the father who is the primary steroids since the son is a phasic and only limited medical records are here for my review the patient was admitted to [facility] on May 24, 2017 to undergo a cranioplasty both cause while his skull is grown as can be expected the cranioplasty did not. Per report of the father the patient had a complicated course had significant bleeding was was difficult getting the cranioplasty removed as it was hearing to the dura. Again per report of the father the patient required 2 units of blood his course was further complicated by an epidural hematoma as well one the medical records and I reviewed per the per report of the father the patient had a stroke of the ischemic variety after surgery.. Other issues while in the acute care hospital and development of the seizures for which the patient was placed on Keppra as well as valproic acid The patient presents with dysphagia and aphasia and a right hemiparesis since he was in his premorbid level of function patient has been referred to rehabilitation and therefore admitted to our facility        Patient was evaluated by Rehab Medicine physician and  Physical Therapy, Occupational Therapy and Speech Therapy and determined to be appropriate for acute inpatient rehab and was transferred to [facility] on 6/8/2017    Pre-mobidly, the patient lived in a single level home in [place] with self.  With this acute therapeutic intervention, this patient hopes to improve his functional status, and return to independent living with the supportive care of family and community resources.        REVIEW OF SYSTEMS:     A 12 point review of systems was performed and was negative in detail with the exception of items mentioned elsewhere in this document with the assistance of the father required to obtain the review of systems with the exception of having some difficulty sleeping as well as some concerns with gas causing GI upset    PMH:  Past Medical History   Diagnosis Date   • Seizure (CMS-[facility])    • Stroke (CMS-[facility])        PSH:  History reviewed. No pertinent past surgical history.    FAMILY HISTORY:  Noncontributory    MEDICATIONS:  Current Facility-Administered Medications   Medication Dose   • levetiracetam (KEPPRA) tablet 1,000 mg  1,000 mg   • divalproex (DEPAKOTE) delayed-release tablet 500 mg  500 mg   • oxycodone-acetaminophen (PERCOCET-10)  MG per tablet 1 Tab  1 Tab   • trazodone (DESYREL) tablet 50 mg  50 mg   • Respiratory Care per Protocol     • acetaminophen (TYLENOL) tablet 650 mg  650 mg   • ondansetron (ZOFRAN ODT) dispertab 4 mg  4 mg    Or   • ondansetron (ZOFRAN) syringe/vial injection 4 mg  4 mg   • docusate sodium (COLACE) capsule 100 mg  100 mg    And   • senna-docusate (PERICOLACE or SENOKOT S) 8.6-50 MG per tablet 1 Tab  1 Tab    And   • lactulose 20 GM/30ML solution 30 mL  30 mL    And   • bisacodyl (DULCOLAX) suppository 10 mg  10 mg    And   • fleet enema 133 mL  1 Each   • simethicone (MYLICON) chewable tab 80 mg  80 mg       ALLERGIES:  [employer] and Sulfa drugs    PSYCHOSOCIAL HISTORY:  Living Site:  Home  Living  "With:  self  Caregiver's availability:  family i.e. parents will be able to provide 24-hour supervision  Number of stairs:  2 flights of stairs to get to apartment  Substance use history: Denies    LEVEL OF FUNCTION PRIOR TO DISABILTY:  Independent    LEVEL OF FUNCTION PRIOR TO ADMISSION to [facility]:  Contact-guard to minimal assistance for ADLs and mobility    CURRENT LEVEL OF FUNCTION:   Same as level of function prior to admission to [facility]    PHYSICAL EXAM:     VITAL SIGNS:   height is 1.689 m (5' 6.5\") and weight is 52.1 kg (114 lb 13.8 oz). His temperature is 37.1 °C (98.8 °F). His blood pressure is 106/64 and his pulse is 76. His respiration is 18 and oxygen saturation is 98%.     GENERAL: No apparent distress  HEENT: Normocephalic/atraumatic, EOMI, PERRL and No nystagmus  CARDIAC: Regular rate and rhythm, normal S1, S2   LUNGS: Clear to auscultation   ABDOMINAL: bowel sounds present, soft, nontender and nondistended    EXTREMITIES: no contractures, spasticity, or edema.  No calf tenderness bilaterally, 2+ bilateral DP/PT pulses.    NEURO:    Mental status: {Alert with both receptive and expressive aphasia expressive for more significant than receptive. It is late in the day and the patient may be tired making performance more impaired than actually is. He also demonstrated a a anomia for common objects. He demonstrated left right confusion and can only follow one-step commands  Speech demonstrating both expressive and receptive aphasia    CRANIAL NERVES:  2,3: visual acuity grossly intact, PERRL  3,4,6: EOMI bilaterally, no nystagmus or diplopia  5: intact in all branches  7: Mild facial droop on right  8: hearing grossly intact  9,10: symmetric palate elevation  11: SCM/Trapezius strength 5/5 bilaterally  12: tongue protrudes midline    Motor:      Motor 5 out of 5 on left side right side demonstrates 4+ to 5 minus strength with decreased dexterity and rapid " alternating movement positive right pronator drift and right      Sensory: intact to light touch through out    DTRs: 2+ in bilateral biceps and patellar tendons  Negative babinski b/l  Negative Harper b/l     Tone: no spasticity noted    Proprioception:  Coordination: finger to nose, intact in the left mildly impaired on right    RADIOLOGY:  No recent films to review we will try and obtain films from [place]    LABS:                  PRIMARY REHAB DIAGNOSIS:    This patient is a 21 y.o. male admitted for acute inpatient rehabilitation with Ischemic stroke (CMS-[facility]). and epidural hematoma after cranioplasty    IMPAIRMENTS:   Cognitive  ADLs/IADLs  Mobility  Speech    SECONDARY DIAGNOSIS/MEDICAL CO-MORBIDITIES AFFECTING FUNCTION:  Previous TBI  Aphasia  Seizures    RELEVANT CHANGES SINCE PREADMISSION EVALUATION:    Status unchanged    The patient's rehabilitation potential is Excellent  The patient's medical prognosis is excellent    PLAN:   Discussion and Recommendations:   1. The patient requires an acute inpatient rehabilitation program with a coordinated program of care at an intensity and frequency not available at a lower level of care. This recommendation is substantiated by the patient's medical physicians who recommend that the patient's intervention and assessment of medical issues needs to be done at an acute level of care for patient's safety and maximum outcome.   2. A coordinated program of care will be supplied by an interdisciplinary team of physical therapy, occupational therapy, rehab physician, rehab nursing, and, if needed, speech therapy and rehab psychology. Rehab team presents a patient-specific rehabilitation and education program concentrating on prevention of future problems related to accessibility, mobility, skin, bowel, bladder, sexuality, and psychosocial and medical/surgical problems.   3. Need for Rehabilitation Physician: The rehab physician will be evaluating the patient on a  multi-weekly basis to help coordinate the program of care. The rehab physician communicates between medical physicians, therapists, and nurses to maximize the patient's potential outcome. Specific areas in which the rehab physician will be providing daily assessment include the following:   A. Assessing the patient's heart rate and blood pressure response (vitals monitoring) to activity and making adjustments in medications or conservative measures as needed.   B. The rehab physician will be assessing the frequency at which the program can be increased to allow the patient to reach optimal functional outcome.   C. The rehab physician will also provide assessments in daily skin care, especially in light of patient's impairments in mobility.   D. The rehab physician will provide special expertise in understanding how to work with functional impairment and recommend appropriate interventions, compensatory techniques, and education that will facilitate the patient's outcome.   4. Rehab R.N.   The rehab RN will be working with patient to carry over in room mobility and activities of daily living when the patient is not in 3 hours of skilled therapy. Rehab nursing will be working in conjunction with rehab physician to address all the medical issues above and continue to assess laboratory work and discuss abnormalities with the treating physicians, assess vitals, and response to activity, and discuss and report abnormalities with the rehab physician. Rehab RN will also continue daily skin care, supervise bladder/bowel program, instruct in medication administration, and ensure patient safety.       REHABILITATION ISSUES/ADVERSE POTENTIAL:  1.  Stroke and epidural hematoma cranioplastyPatient demonstrates functional deficits in cognition, behavior, strength, balance, coordination, and ADL's. The patient requires therapy to correct these deficits prior to discharge. Patient is [facility] for  comprehensive rehabilitation therapy, including physical, occupational and speech therapy.     Rehabilitation nursing monitors bowel and bladder control, educates on medication administration, co-morbidities and monitors patient safety.    Therapies to treat at intensity and frequency of (may change after completion of evaluation by all therapeutic disciplines):       PT:  Physical therapy to address mobility, transfer, gait training and evaluation for adaptive equipment needs 1hour/day at least 5 days/week for the duration of the ELOS (see below)       OT:  Occupational therapy to address ADLs, self-care, home management training, functional mobility/transfers and assistive device evaluation, and community re-integration 1hour/day at least 5 days/week for the duration of the ELOS (see below).        ST/Dysphagia:  Speech therapy to address speech, language, and cognitive deficits as well as swallowing difficulties with retraining/dysphagia management and community re-integration with comprehension, expression, cognitive training 1hour/day at least 5 days/week for the duration of the ELOS (see below).     2.  Neurostimulants: None at this time but continue to assess daily for need to initiate should status change.    3.  DVT prophylaxis:  Patient is on only physical measures for anticoagulation upon transfer because of recent epidural. Encourage OOB. Monitor daily for signs and symptoms of DVT including but not limited to swelling and pain to prevent the development of DVT that may interfere with therapies.  .    4.  Pain: No issues with pain currently / Controlled with acetaminophen narcotics are also available     5.  Nutrition/Dysphagia: Dietician monitors nutrient intake, recommend supplements prn and provide nutrition education to pt/family to promote optimal nutrition for wound healing/recovery.     6.  Bladder/bowel:  Start bowel and bladder program as described below, to prevent constipation, urinary  retention (which may lead to UTI), and urinary incontinence (which will impact upon pt's functional independence).   - TV Q3h while awake with post void bladder scans, I&O cath for PVRs >400  - up to commode after meal     7.  Skin/dermal ulcer prophylaxis: Monitor for new skin conditions with q.2 h. turns as required to prevent the development of skin breakdown.     8.  Cognition/Behavior:  Psychologist Dr. De Paz provides adjustment counseling to illness and psychosocial barriers that may be potential barriers to rehabilitation.     10. Respiratory therapy: RT performs O2 management prn, breathing retraining, pulmonary hygiene and bronchospasm management prn to optimize participation in therapies.    MEDICAL CO-MORBIDITIES/ADVERSE POTENTIAL AFFECTING FUNCTION:  Previous TBI  Aphasia  Seizures  Mild right hemiparesis  GI upset secondary to gas    Pt was seen today for> 75 min, and entire time spent in face-to-face contact was >50% in counseling and coordination of care as detailed in A/P above.        GOALS/EXPECTED LEVEL OF FUNCTION BASED ON CURRENT MEDICAL AND FUNCTIONAL STATUS (may change based on patient's medical status and rate of impairment recovery):  Transfers:   Modified Independent  Mobility/Gait:   Modified Independent  ADL's:   Modified Independent  Cognition:  Mod I/ Supervision  Swallowing: Mod I  Communication: Modified independent    DISPOSITION: Discharge to pre-morbid independent living setting with the supportive care of patient's family and community resources.      ELOS: 14 days

## 2017-06-09 NOTE — PROGRESS NOTES
Received report from NOC RN and assumed patient care at this time. During first rounds, patient awake and working with occupational therapy. Will continue to monitor.

## 2017-06-09 NOTE — THERAPY
Speech Therapy Initial Plan of Care Note    1) Assessment:  Patient is 21 y.o. male with a diagnosis of epidural hematoma.  Additional factors influencing patient status / progress (ie: cognitive factors, co-morbidities, social support, etc): expressive aphasia, receptive aphasia, and cognitive deficits.  Long term and short term goals have been discussed with patient and they are in agreement.  2) Plan:  Recommend Speech Therapy  minutes per day 5-6 days per week for 4  weeks for the following treatments:  Dysphagia management, cognitive intervention, expressive and receptive language intervention.   3) Goals:  Please refer to care plan for goals.

## 2017-06-09 NOTE — CARE PLAN
Problem: Safety  Goal: Will remain free from injury  Outcome: PROGRESSING AS EXPECTED  Per Ace from PT, patient is allowed to ambulate with father and staff with contact guard assist and gait belt.     Problem: Infection  Goal: Will remain free from infection  Outcome: PROGRESSING AS EXPECTED  Vital signs remain within normal limits, patient denies new pain and discomfort. Lungs are clear to auscultation bilaterally in all lobes. Patient denies urinary pain and discomfort. Skin is intact aside from crani incision and free of signs and symptoms of infection. Will continue to monitor.     Problem: Discharge Barriers/Planning  Goal: Patient’s continuum of care needs will be met  Outcome: PROGRESSING AS EXPECTED  Labs that need to be drawn are CBC CMP prealbumin valporic acid and LFT's. Discussed with patient and his father that this will need to be completed today as they refused AM lab draws. Patient states he prefers to wait until 1500 after he has had time to rest after his last scheduled therapy for the day. Father is agreeable to this plan. Will provide warm blankets and ginger ale to hopefully prevent any patient uneasiness. Will endorse to NOC RN that patient needs to be woken up around 15 minutes prior to lab draws and to perform his draws last. Dr Fernandes aware.

## 2017-06-09 NOTE — PROGRESS NOTES
"Rehab Progress Note     Interval Events (Subjective)  Notes of the therapist appreciated I discussed the case with PT and speech therapy. His cognition is clearly better than it was yesterday with no left right confusion as well as better word finding. He spoke to the patient's father who remains perseverative on a keyboard by again discussed with him and the therapist and this is not our initial issue. The patient's blood was not drawn today patient's father objected to having early morning blood draw    Objective:  VITAL SIGNS: /64 mmHg  Pulse 76  Temp(Src) 37.1 °C (98.8 °F)  Resp 18  Ht 1.689 m (5' 6.5\")  Wt 52.1 kg (114 lb 13.8 oz)  BMI 18.26 kg/m2  SpO2 98%  Cardiac: regular rate and rhythm, nl S1/S2   Lungs: clear to auscultation bilaterally.   Abdomen: soft; NT, ND, BS present.   Extremities: Without clubbing cyanosis or edema  Neuro improved cognition mobility and ADLs with supervision at this time    No results found for this or any previous visit (from the past 72 hour(s)).    Current Facility-Administered Medications   Medication Frequency   • levetiracetam (KEPPRA) tablet 1,000 mg Q12HRS   • divalproex (DEPAKOTE) delayed-release tablet 500 mg QHS   • oxycodone-acetaminophen (PERCOCET-10)  MG per tablet 1 Tab Q6HRS PRN   • trazodone (DESYREL) tablet 50 mg QHS PRN   • Respiratory Care per Protocol Continuous RT   • acetaminophen (TYLENOL) tablet 650 mg Q4HRS PRN   • ondansetron (ZOFRAN ODT) dispertab 4 mg 4X/DAY PRN    Or   • ondansetron (ZOFRAN) syringe/vial injection 4 mg 4X/DAY PRN   • docusate sodium (COLACE) capsule 100 mg DAILY    And   • senna-docusate (PERICOLACE or SENOKOT S) 8.6-50 MG per tablet 1 Tab Q EVENING    And   • lactulose 20 GM/30ML solution 30 mL PRN    And   • bisacodyl (DULCOLAX) suppository 10 mg PRN    And   • fleet enema 133 mL PRN   • simethicone (MYLICON) chewable tab 80 mg TID PRN       No orders of the defined types were placed in this encounter.   "     Assessment:  Active Hospital Problems    Diagnosis   • *Ischemic stroke (CMS-[facility])   • History of traumatic brain injury   • Anomia   • Aphasia   • Impaired mobility and ADLs   • Cognitive and behavioral changes   • Epidural hematoma (CMS-[facility])       Medical Decision Making and Plan:  Doing better today  Initially scheduled for a team conference for participation came in yesterday I delineated to Wednesday, 6/15/2017  Continue PT, OT and SLP    Total time: > 25 minutes.  I spent greater than 50% of the time for patient care and coordination on this date, including unit/floor time, and face-to-face time with the patient as per assessment and plan above.    Porfirio Fernandes M.D.

## 2017-06-09 NOTE — PROGRESS NOTES
"Pt. Refused blood works, according to the father of the pt. \" it is traumatic for him when you just show up and draw blood, it makes him anxious and later on throw up\" then pt's father close the door while staff is still in the doorway.   "

## 2017-06-09 NOTE — REHAB-PHARMACY IDT TEAM NOTE
Pharmacy  Pharmacy  Antibiotics/Antifungals/Antivirals:  Medication:      Active Orders     None        Route:        NA  Stop Date:  NA  Reason:      NA  Antihypertensives/Cardiac:  Medication:    Orders     None        Patient Vitals for the past 24 hrs:   BP Pulse   06/08/17 1545 - 97   06/08/17 1500 127/66 mmHg 85       Anticoagulation:  Medication: None    Other key medications: A review of the medication list reveals no issues at this time.    Section completed by: Zan Robertson [facility]

## 2017-06-09 NOTE — THERAPY
Physical Therapy Initial Plan of Care Note    1) Assessment: Patient is 21 y.o. male with a diagnosis of epidural hematoma during L craniotomy.  Additional factors influencing patient status / progress (ie: cognitive factors, co-morbidities, social support, etc): seizure precautions, fall risk, max expressive more than receptive aphasia, impaired word finding, impaired safety insight, impaired balance, good strength, good social support, IND PLOF as college student at [employer], lives alone in an apartment [place] a [address] with stairs to enter, may d/c home with family temporarily, mild BLE tone and 4 beat B ankle clonus noted.  Long term and short term goals have been discussed with patient and they are in agreement.  2) Plan: Recommend Physical Therapy 30-60 minutes per day 5-6 days per week for 2 weeks for the following treatments:  PT Gait Training, PT Therapeutic Exercises, PT Neuro Re-Ed/Balance, PT Therapeutic Activity and PT Manual Therapy.  3) Goals:  Please refer to care plan for goals.

## 2017-06-09 NOTE — CARE PLAN
Problem: Safety  Goal: Will remain free from injury  Outcome: PROGRESSING AS EXPECTED  Pt. Encouraged to use call light within reach, father at bedside very supportive. Padded siderails up for seizure precaution, hourly rounding continue for safety.    Problem: Pain Management  Goal: Pain level will decrease to patient’s comfort goal  Outcome: PROGRESSING AS EXPECTED  Pt. Resting comfortably and sleeping well, pain is controlled at this time, continue monitor condition, pt. Repositioned self in bed for comfort.

## 2017-06-09 NOTE — THERAPY
Occupational Therapy Initial Plan of Care Note    1) Assessment:  Patient is 21 y.o. male with a diagnosis of Other Neuro, Epidural hematoma (CMS-HCC).  Additional factors influencing patient status / progress (ie: cognitive factors, co-morbidities, social support, etc): ***.  Long term and short term goals have been discussed with {REHAB STG DISCUSSED:96755} and they are in agreement.  2) Plan:  Recommend Occupational Therapy {REHAB MINUTES PER DAY:08091} minutes per day {REHAB DAYS/WEEK:06717} days per week for *** {REHAB DAYS/WEEKS:95347} for the following treatments:  {REHAB OT TREATMENTS:53604}.  3) Goals:  Please refer to care plan for goals.

## 2017-06-10 PROCEDURE — A9270 NON-COVERED ITEM OR SERVICE: HCPCS | Performed by: PHYSICAL MEDICINE & REHABILITATION

## 2017-06-10 PROCEDURE — 92507 TX SP LANG VOICE COMM INDIV: CPT

## 2017-06-10 PROCEDURE — 97530 THERAPEUTIC ACTIVITIES: CPT

## 2017-06-10 PROCEDURE — 97112 NEUROMUSCULAR REEDUCATION: CPT

## 2017-06-10 PROCEDURE — 700102 HCHG RX REV CODE 250 W/ 637 OVERRIDE(OP): Performed by: PHYSICAL MEDICINE & REHABILITATION

## 2017-06-10 PROCEDURE — 700112 HCHG RX REV CODE 229: Performed by: PHYSICAL MEDICINE & REHABILITATION

## 2017-06-10 PROCEDURE — 770010 HCHG ROOM/CARE - REHAB SEMI PRIVAT*

## 2017-06-10 RX ADMIN — LEVETIRACETAM 1000 MG: 500 TABLET, FILM COATED ORAL at 20:01

## 2017-06-10 RX ADMIN — DIVALPROEX SODIUM 500 MG: 500 TABLET, DELAYED RELEASE ORAL at 20:01

## 2017-06-10 RX ADMIN — DOCUSATE SODIUM 100 MG: 100 CAPSULE, LIQUID FILLED ORAL at 08:26

## 2017-06-10 RX ADMIN — Medication 1 TABLET: at 20:01

## 2017-06-10 RX ADMIN — LEVETIRACETAM 1000 MG: 500 TABLET, FILM COATED ORAL at 08:26

## 2017-06-10 ASSESSMENT — PAIN SCALES - GENERAL
PAINLEVEL_OUTOF10: 0
PAINLEVEL_OUTOF10: 2

## 2017-06-10 ASSESSMENT — GAIT ASSESSMENTS
DEVIATION: DECREASED BASE OF SUPPORT
DISTANCE (FEET): 1000
GAIT LEVEL OF ASSIST: STAND BY ASSIST

## 2017-06-10 NOTE — CARE PLAN
Problem: Safety  Goal: Will remain free from falls  Outcome: PROGRESSING AS EXPECTED  Patient demonstrates good safety technique this shift.  Asks for assistance when needed and does not attempt self transfer.  Able to verbalize needs.      Problem: Bowel/Gastric:  Goal: Normal bowel function is maintained or improved  Outcome: PROGRESSING AS EXPECTED  Patient having regular bowel movements; last BM 6/10/17.  Denies s/s constipation; bowel meds available if needed.  Will continue to monitor.

## 2017-06-10 NOTE — CARE PLAN
Problem: Safety  Goal: Will remain free from injury  Outcome: PROGRESSING AS EXPECTED  Pt. Encourage to use call light within reach, refused bed alarm despite of explanation, pt's mother at bedside all night very supportive and assist pt. To walk to the toilet. Continue hourly rounding for safety.    Problem: Infection  Goal: Will remain free from infection  Outcome: PROGRESSING AS EXPECTED  Pt. Head incision dry and clean open to air, no s/s of infection noted. Continue monitor incision site.    Problem: Pain Management  Goal: Pain level will decrease to patient’s comfort goal  Outcome: PROGRESSING AS EXPECTED  Pt. Denies pain at this time, resting comfortably in bed. Seizure precaution measures observe. Request Trazodone given. Continue monitor condition.

## 2017-06-11 PROCEDURE — 97116 GAIT TRAINING THERAPY: CPT

## 2017-06-11 PROCEDURE — 92507 TX SP LANG VOICE COMM INDIV: CPT

## 2017-06-11 PROCEDURE — 97112 NEUROMUSCULAR REEDUCATION: CPT

## 2017-06-11 PROCEDURE — 700102 HCHG RX REV CODE 250 W/ 637 OVERRIDE(OP): Performed by: PHYSICAL MEDICINE & REHABILITATION

## 2017-06-11 PROCEDURE — A9270 NON-COVERED ITEM OR SERVICE: HCPCS | Performed by: PHYSICAL MEDICINE & REHABILITATION

## 2017-06-11 PROCEDURE — 770010 HCHG ROOM/CARE - REHAB SEMI PRIVAT*

## 2017-06-11 RX ADMIN — LEVETIRACETAM 1000 MG: 500 TABLET, FILM COATED ORAL at 20:36

## 2017-06-11 RX ADMIN — TRAZODONE HYDROCHLORIDE 50 MG: 50 TABLET ORAL at 20:37

## 2017-06-11 RX ADMIN — LEVETIRACETAM 1000 MG: 500 TABLET, FILM COATED ORAL at 09:28

## 2017-06-11 RX ADMIN — DIVALPROEX SODIUM 500 MG: 500 TABLET, DELAYED RELEASE ORAL at 20:36

## 2017-06-11 RX ADMIN — Medication 1 TABLET: at 20:37

## 2017-06-11 ASSESSMENT — PAIN SCALES - GENERAL
PAINLEVEL_OUTOF10: 0
PAINLEVEL_OUTOF10: 1

## 2017-06-11 ASSESSMENT — GAIT ASSESSMENTS
DEVIATION: DECREASED BASE OF SUPPORT
GAIT LEVEL OF ASSIST: SUPERVISED
DISTANCE (FEET): 1000

## 2017-06-11 NOTE — CARE PLAN
Problem: Safety  Goal: Will remain free from injury  Outcome: PROGRESSING AS EXPECTED  Pt. Uses call light within reach, no impulsiveness noted and mother at the bedside clear to assist pt. While ambulating to the toilet. Pt.and mother Want the door closed.    Problem: Infection  Goal: Will remain free from infection  Outcome: PROGRESSING AS EXPECTED  Pt. Surgical incision in the head with sutures intact clean and dry open to air, no s/s of infection noted.    Problem: Pain Management  Goal: Pain level will decrease to patient’s comfort goal  Outcome: PROGRESSING AS EXPECTED  Pt. Resting well comfortably, pain in the legs but tolerable per pt. Continue monitor condition. Seizure precaution measures observe.

## 2017-06-11 NOTE — CARE PLAN
Problem: Safety  Goal: Will remain free from injury  Outcome: PROGRESSING AS EXPECTED  Pt free from falls or injury this shift. Room close to nurse's station, mother at BS at all times today. Pt does not use call light; mother has been out to nurse's station numerous times today for pt requests.     Problem: Pain Management  Goal: Pain level will decrease to patient’s comfort goal  Outcome: PROGRESSING AS EXPECTED  Pt reports no pain or discomfort this shift, confirmed by mother's questions as well. Pt participates in therapies and completes ADLs as able and appropriate this shift.

## 2017-06-12 PROCEDURE — 97530 THERAPEUTIC ACTIVITIES: CPT

## 2017-06-12 PROCEDURE — 97535 SELF CARE MNGMENT TRAINING: CPT

## 2017-06-12 PROCEDURE — 99232 SBSQ HOSP IP/OBS MODERATE 35: CPT | Performed by: PHYSICAL MEDICINE & REHABILITATION

## 2017-06-12 PROCEDURE — 97116 GAIT TRAINING THERAPY: CPT

## 2017-06-12 PROCEDURE — 97532 HCHG COGNITIVE SKILL DEV. 15 MIN: CPT

## 2017-06-12 PROCEDURE — 700102 HCHG RX REV CODE 250 W/ 637 OVERRIDE(OP): Performed by: PHYSICAL MEDICINE & REHABILITATION

## 2017-06-12 PROCEDURE — 770010 HCHG ROOM/CARE - REHAB SEMI PRIVAT*

## 2017-06-12 PROCEDURE — 97112 NEUROMUSCULAR REEDUCATION: CPT

## 2017-06-12 PROCEDURE — A9270 NON-COVERED ITEM OR SERVICE: HCPCS | Performed by: PHYSICAL MEDICINE & REHABILITATION

## 2017-06-12 PROCEDURE — 92507 TX SP LANG VOICE COMM INDIV: CPT

## 2017-06-12 RX ADMIN — DIVALPROEX SODIUM 500 MG: 500 TABLET, DELAYED RELEASE ORAL at 20:35

## 2017-06-12 RX ADMIN — LEVETIRACETAM 1000 MG: 500 TABLET, FILM COATED ORAL at 20:35

## 2017-06-12 RX ADMIN — LEVETIRACETAM 1000 MG: 500 TABLET, FILM COATED ORAL at 08:21

## 2017-06-12 RX ADMIN — TRAZODONE HYDROCHLORIDE 50 MG: 50 TABLET ORAL at 22:32

## 2017-06-12 ASSESSMENT — PAIN SCALES - GENERAL
PAINLEVEL_OUTOF10: 0
PAINLEVEL_OUTOF10: 0

## 2017-06-12 ASSESSMENT — GAIT ASSESSMENTS: DEVIATION: TRENDELENBERG;OTHER (COMMENT)

## 2017-06-12 NOTE — CARE PLAN
Problem: Safety  Goal: Will remain free from injury  Outcome: PROGRESSING AS EXPECTED  Pt continues to be free from falls or injury this shift. Both father and mother present at BS today and both cleared to transfer. Pt's mother asks for anything the pt needs.

## 2017-06-12 NOTE — CARE PLAN
Problem: Infection  Goal: Will remain free from infection  Outcome: PROGRESSING AS EXPECTED  Pt head incisions well-approximated with sutures. No s/s infection, healing well. Pt afebrile, no c/o pain or discomfort.

## 2017-06-12 NOTE — CARE PLAN
Problem: Safety  Goal: Will remain free from injury  Outcome: PROGRESSING AS EXPECTED  Pt. Uses call light within reach and wait for assistance, mother at the bedside very supportive and caring to the pt. Ambulates to the toilet with his mother at the side for safety. Hourly rounding continue and monitor in the tv monitor.    Problem: Pain Management  Goal: Pain level will decrease to patient’s comfort goal  Outcome: PROGRESSING AS EXPECTED  Pt. Resting comfortably, denies acute pain noted. Repositioned self in bed for comfort and comfort food

## 2017-06-12 NOTE — CARE PLAN
Problem: Infection  Goal: Will remain free from infection  Outcome: PROGRESSING AS EXPECTED  Pt. Surgical incision in the head is clean and dry with no s/s of infection, open to air healing well.

## 2017-06-13 PROCEDURE — 97530 THERAPEUTIC ACTIVITIES: CPT

## 2017-06-13 PROCEDURE — 97110 THERAPEUTIC EXERCISES: CPT

## 2017-06-13 PROCEDURE — 92507 TX SP LANG VOICE COMM INDIV: CPT

## 2017-06-13 PROCEDURE — 700102 HCHG RX REV CODE 250 W/ 637 OVERRIDE(OP): Performed by: PHYSICAL MEDICINE & REHABILITATION

## 2017-06-13 PROCEDURE — 97532 HCHG COGNITIVE SKILL DEV. 15 MIN: CPT

## 2017-06-13 PROCEDURE — A9270 NON-COVERED ITEM OR SERVICE: HCPCS | Performed by: PHYSICAL MEDICINE & REHABILITATION

## 2017-06-13 PROCEDURE — 97112 NEUROMUSCULAR REEDUCATION: CPT

## 2017-06-13 PROCEDURE — 770010 HCHG ROOM/CARE - REHAB SEMI PRIVAT*

## 2017-06-13 RX ADMIN — LEVETIRACETAM 1000 MG: 500 TABLET, FILM COATED ORAL at 09:38

## 2017-06-13 RX ADMIN — DIVALPROEX SODIUM 500 MG: 500 TABLET, DELAYED RELEASE ORAL at 20:23

## 2017-06-13 RX ADMIN — TRAZODONE HYDROCHLORIDE 50 MG: 50 TABLET ORAL at 22:30

## 2017-06-13 RX ADMIN — LEVETIRACETAM 1000 MG: 500 TABLET, FILM COATED ORAL at 20:23

## 2017-06-13 ASSESSMENT — PAIN SCALES - GENERAL
PAINLEVEL_OUTOF10: 0

## 2017-06-13 ASSESSMENT — GAIT ASSESSMENTS: DEVIATION: OTHER (COMMENT)

## 2017-06-13 NOTE — CARE PLAN
Problem: Safety  Goal: Will remain free from falls  Outcome: PROGRESSING AS EXPECTED  Pt uses call light consistently and appropriately. Waits for assistance does not attempt self transfer this shift. Able to verbalize needs.    Problem: Pain Management  Goal: Pain level will decrease to patient’s comfort goal  Outcome: PROGRESSING AS EXPECTED  Patient denies pain this shift, participates in all therapies. Will continue to monitor and assess.

## 2017-06-13 NOTE — PROGRESS NOTES
"Rehab Progress Note     Interval Events (Subjective)  Seen in his room today. Doing quite well overall. Language and cognition remain biggest area of deficits. Notes of the therapist appreciated     Objective:  VITAL SIGNS: /70 mmHg  Pulse 100  Temp(Src) 36.8 °C (98.2 °F)  Resp 18  Ht 1.689 m (5' 6.5\")  Wt 54.2 kg (119 lb 7.8 oz)  BMI 19.00 kg/m2  SpO2 93%  Cardiac: regular rate and rhythm, nl S1/S2    Lungs: clear to auscultation bilaterally.    Abdomen: soft; NT, ND, BS present.    Extremities: Without clubbing cyanosis or edema  Neuro improved cognition mobility and performance of ADLS. Expressive language is more impaired than  receptive    No results found for this or any previous visit (from the past 72 hour(s)).    Current Facility-Administered Medications   Medication Frequency   • levetiracetam (KEPPRA) tablet 1,000 mg Q12HRS   • divalproex (DEPAKOTE) delayed-release tablet 500 mg QHS   • oxycodone-acetaminophen (PERCOCET-10)  MG per tablet 1 Tab Q6HRS PRN   • trazodone (DESYREL) tablet 50 mg QHS PRN   • Respiratory Care per Protocol Continuous RT   • acetaminophen (TYLENOL) tablet 650 mg Q4HRS PRN   • ondansetron (ZOFRAN ODT) dispertab 4 mg 4X/DAY PRN    Or   • ondansetron (ZOFRAN) syringe/vial injection 4 mg 4X/DAY PRN   • docusate sodium (COLACE) capsule 100 mg DAILY    And   • senna-docusate (PERICOLACE or SENOKOT S) 8.6-50 MG per tablet 1 Tab Q EVENING    And   • lactulose 20 GM/30ML solution 30 mL PRN    And   • bisacodyl (DULCOLAX) suppository 10 mg PRN    And   • fleet enema 133 mL PRN   • simethicone (MYLICON) chewable tab 80 mg TID PRN       Orders Placed This Encounter   Procedures   • DIET ORDER     Standing Status: Standing      Number of Occurrences: 1      Standing Expiration Date:      Order Specific Question:  Diet:     Answer:  Regular [1]     Order Specific Question:  Consistency/Fluid modifications:     Answer:  Thin Liquids [3]       Assessment:  Active Hospital " Problems    Diagnosis   • *Ischemic stroke (CMS-[facility])   • History of traumatic brain injury   • Anomia   • Aphasia   • Impaired mobility and ADLs   • Cognitive and behavioral changes   • Epidural hematoma (CMS-[facility])       Medical Decision Making and Plan:  Patient  Is making excellent progress. Language and cognition are the areas with the greatest deficits  Will continue PT, OT and SLP  For Team conference this Wednesday 6/14/2017    Total time:  > 25 minutes.  I spent greater than 50% of the time for patient care and coordination on this date, including unit/floor time, and face-to-face time with the patient as per assessment and plan above.    Porfirio Fernandes M.D.

## 2017-06-13 NOTE — PROGRESS NOTES
Discussed patient request for cereal at 0630 in the morning with dietary team who state that they will follow up with patient regarding preference.

## 2017-06-13 NOTE — CARE PLAN
Problem: Communication  Goal: The ability to communicate needs accurately and effectively will improve  Outcome: PROGRESSING AS EXPECTED  Pt is slightly aphasic, but is able to communicate his needs to staff.    Problem: Safety  Goal: Will remain free from injury  Outcome: PROGRESSING AS EXPECTED  Pt uses call light consistently for staff assistance. Pt has good safety awareness, no impulsivity observed.    Problem: Infection  Goal: Will remain free from infection  Outcome: PROGRESSING AS EXPECTED  No s/s of infection present    Problem: Bowel/Gastric:  Goal: Will not experience complications related to bowel motility  Outcome: PROGRESSING AS EXPECTED  Pt is continent of bowel with last BM 6/12/17. Pt refused scheduled stool softener this evening.    Problem: Pain Management  Goal: Pain level will decrease to patient’s comfort goal  Outcome: PROGRESSING AS EXPECTED  No reports of pain at this time. Will continue to monitor through shift with hourly rounds.

## 2017-06-13 NOTE — REHAB-PHARMACY IDT TEAM NOTE
Pharmacy  Pharmacy  Antibiotics/Antifungals/Antivirals:  Medication:      Active Orders     None        Route:         n/a  Stop Date:  n/a  Reason:   Antihypertensives/Cardiac:  Medication:    Orders     None        Patient Vitals for the past 24 hrs:   BP Pulse   06/13/17 0645 104/65 mmHg 94   06/12/17 1900 116/70 mmHg 100   06/12/17 1400 100/67 mmHg 93       Anticoagulation:  Medication:  n/a  INR:      Other key medications: Depakote, levetiracetam, trazodone.     A review of the medication list reveals no issues at this time.  Section completed by:  Leatha Arce RPH

## 2017-06-13 NOTE — CARE PLAN
Problem: Comprehension STGs  Goal: STG-Within one week, patient will  1) Individualized goal: Follow 2 step directions with 80% accuracy provided minimal assistance.   2) Interventions: SLP Speech Language Treatment, SLP Self Care / ADL Training and SLP Cognitive Skill Development    Outcome: NOT MET  Mod a for 2 step commands     Problem: Expression STGs  Goal: STG-Within one week, patient will  1) Individualized goal: Name common pictures or objects with 70% accuracy provided MOD A.   2) Interventions: SLP Speech Language Treatment    Outcome: MET Date Met:  06/13/17  Goal met  Goal: STG-Within one week, patient will  1) Individualized goal: Imitate C, V, CVC word forms with 50% accuracy provided max A.   2) Interventions: SLP Speech Language Treatment    Outcome: MET Date Met:  06/13/17  Goal met    Problem: Memory STGs  Goal: STG-Within one week, patient will  1) Individualized goal: Answer orientation questions with 80% accuracy provided minimal assistance.   2) Interventions: SLP Speech Language Treatment, SLP Self Care / ADL Training and SLP Cognitive Skill Development    Outcome: MET Date Met:  06/13/17  Goal met

## 2017-06-13 NOTE — REHAB-SLP IDT TEAM NOTE
Speech Therapy  Cognitive Linquistic Functions  Comprehension Initial:  5 - Stand-by Prompting/Supervision or Set-up  Comprehension Current:  3 - Moderate Assistance   Comprehension Description:  Verbal cues  Expression Initial:  4 - Minimal Assistance  Expression Current:  3 - Moderate Assistance   Expression Description:  Verbal cueing  Social Interaction Initial:  6 - Modified Independent  Social Interaction Current:  5 - Stand-by Prompting/Supervision or Set-up   Social Interaction Description:  Increased time, Low stim environment, Verbal cues, Schedule  Problem Solving Initial:  3 - Moderate Assistance  Problem Solving Current:  3 - Moderate Assistance   Problem Solving Description:  Verbal cueing, Therapy schedule  Memory Initial:  2 - Max Assistance  Memory Current:  3 - Moderate Assistance   Memory Description:  Verbal cueing, Therapy schedule  Executive Functioning / Organization Initial:     Executive Functioning / Organization Current:   4 minimal assistance    Executive Functioning Desciption: planning/organization   Swallowing  Swallowing Status: Regular diet and thin liquids - not seen for dysphagia management    Orders Placed This Encounter   Procedures   • DIET ORDER     Standing Status: Standing      Number of Occurrences: 1      Standing Expiration Date:      Order Specific Question:  Diet:     Answer:  Regular [1]     Order Specific Question:  Consistency/Fluid modifications:     Answer:  Thin Liquids [3]     Behavior Modification Plan  Allow for rest time, Keep instructions simple/concrete, Give clear feedback, Redirect to task/topic, Provide reasonable choices, Decrease the chance of failure by offering activities that are within the patient's abilities, Analyze tasks (break down into smaller steps) and Reinforce participation in desired tasks  Assistive Technology  Low/Impaired vision equipment and Low tech: Calendar, planner, schedule, alarms/timers, pill organizer, post-it notes,  lists  Family Training/Education:  Ongoing with parents and family friends  DC Recommendations:   TBD  Strengths:  Good insight into deficits/needs, Independent PLOF, Motivated for self care and independence, Pleasant and cooperative, Supportive family and Willingly participates in therapeutic activities  Barriers:  Aphasia expressive, Aphasia receptive, Unable to follow instructions and Impulsive  # of short term goals set=4  # of short term goals met=3        Speech Therapy Problems           Problem: Comprehension STGs     Dates: Start: 06/09/17       Goal: STG-Within one week, patient will     Dates: Start: 06/09/17      Description: 1) Individualized goal:  Follow 2 step directions with 80% accuracy provided minimal assistance.     2) Interventions:  SLP Speech Language Treatment, SLP Self Care / ADL Training  and SLP Cognitive Skill Development        Note: Goal Note filed on 06/13/17 141 by Isaura Moran MS,CCC-SLP    Goal: STG-Within one week, patient will  Outcome: NOT MET  Mod a for 2 step commands             Problem: Expression STGs     Dates: Start: 06/09/17       Goal: STG-Within one week, patient will     Dates: Start: 06/13/17      Description: 1) Individualized goal:  Describe at least three semantic features when presented with a picture or object with 80% accuracy provided minimal assistance.     2) Interventions:  SLP Speech Language Treatment              Problem: Memory STGs     Dates: Start: 06/09/17       Goal: STG-Within one week, patient will     Dates: Start: 06/13/17      Description: 1) Individualized goal:  Recall information related to hospitalization and daily tasks with 80% accuracy provided minimal assistance.     2) Interventions:  SLP Self Care / ADL Training  and SLP Cognitive Skill Development              Problem: Problem Solving STGs     Dates: Start: 06/13/17       Goal: STG-Within one week, patient will     Dates: Start: 06/13/17      Description: 1) Individualized  goal:  Complete alternating and divided attention tasks with 80% accuracy provided minimal assistance.     2) Interventions:  SLP Self Care / ADL Training  and SLP Cognitive Skill Development              Problem: Speech/Swallowing LTGs     Dates: Start: 06/09/17       Goal: LTG-By discharge, patient will     Dates: Start: 06/09/17      Description: 1) Individualized goal:  Express wants and needs with SPV at time of discharge with 80% accuracy.     2) Interventions:  SLP Speech Language Treatment            Goal: LTG-By discharge, patient will     Dates: Start: 06/09/17      Description: 1) Individualized goal:  Recall information and complete functional problem solving tasks with 80% accuracy provided SPV    2) Interventions:  SLP Speech Language Treatment                   Section completed by:  Isaura Moran MS,CCC-SLP

## 2017-06-14 PROCEDURE — 770010 HCHG ROOM/CARE - REHAB SEMI PRIVAT*

## 2017-06-14 PROCEDURE — 97116 GAIT TRAINING THERAPY: CPT

## 2017-06-14 PROCEDURE — 99233 SBSQ HOSP IP/OBS HIGH 50: CPT | Performed by: PHYSICAL MEDICINE & REHABILITATION

## 2017-06-14 PROCEDURE — A9270 NON-COVERED ITEM OR SERVICE: HCPCS | Performed by: PHYSICAL MEDICINE & REHABILITATION

## 2017-06-14 PROCEDURE — 92507 TX SP LANG VOICE COMM INDIV: CPT

## 2017-06-14 PROCEDURE — 700102 HCHG RX REV CODE 250 W/ 637 OVERRIDE(OP): Performed by: PHYSICAL MEDICINE & REHABILITATION

## 2017-06-14 PROCEDURE — 97112 NEUROMUSCULAR REEDUCATION: CPT

## 2017-06-14 PROCEDURE — 97110 THERAPEUTIC EXERCISES: CPT

## 2017-06-14 PROCEDURE — 97532 HCHG COGNITIVE SKILL DEV. 15 MIN: CPT

## 2017-06-14 PROCEDURE — 97535 SELF CARE MNGMENT TRAINING: CPT

## 2017-06-14 RX ADMIN — LEVETIRACETAM 1000 MG: 500 TABLET, FILM COATED ORAL at 21:00

## 2017-06-14 RX ADMIN — DIVALPROEX SODIUM 500 MG: 500 TABLET, DELAYED RELEASE ORAL at 21:00

## 2017-06-14 RX ADMIN — Medication 1 TABLET: at 21:00

## 2017-06-14 RX ADMIN — LEVETIRACETAM 1000 MG: 500 TABLET, FILM COATED ORAL at 08:19

## 2017-06-14 ASSESSMENT — GAIT ASSESSMENTS
DISTANCE (FEET): 1500
DEVIATION: OTHER (COMMENT)
GAIT LEVEL OF ASSIST: STAND BY ASSIST

## 2017-06-14 ASSESSMENT — PAIN SCALES - GENERAL
PAINLEVEL_OUTOF10: 0
PAINLEVEL_OUTOF10: 0

## 2017-06-14 NOTE — REHAB-PT IDT TEAM NOTE
Physical Therapy  Mobility  Bed mobility:   Mod I, elevated HOB  Bed /Chair/Wheelchair Transfer Initial:  5 - Standby Prompting/Supervision or Set-up  Bed /Chair/Wheelchair Transfer Current:  6 - Modified Independent   Bed/Chair/Wheelchair Transfer Description:   (elevated HOB, Mod I)  Walk Initial:  4 - Minimal Assistance  Walk Current:  5 - Standby Prompting/Supervision or Set-up   Walk Description:   (Mod I in room; SPV and cueing indoors x800 feet without fatigue noted, no AD)  Wheelchair Initial:     Wheelchair Current:      Wheelchair Description:  n/a due to ambulatory status  Stairs Initial:  4 - Minimal Assistance  Stairs Current: 5 - Standby Prompting/Supervision or Set-up   Stairs Description:  (verbal cues to terminate task)  Patient/Family Training/Education:  Ongoing patient and father education  DME/DC Recommendations:  1 week; no AD; 24/7 SPV preferred; outpatient PT for balance  Strengths:  Able to follow instructions, Adequate strength, Good endurance, Independent PLOF, Making steady progress towards goals, Motivated for self care and independence, Pleasant and cooperative, Supportive family and Willingly participates in therapeutic activities  Barriers:   Other: mild aphasia and Fair balance  # of short term goals set= 4  # of short term goals met= 3        Physical Therapy Problems           Problem: Balance     Dates: Start: 06/09/17       Goal: STG-Within one week, patient will maintain dynamic standing     Dates: Start: 06/09/17      Description: 1) Individualized goal:  Low fall risk on Rodriguez or DGI balance assessment    2) Interventions:  PT Gait Training, PT Therapeutic Exercises, PT Neuro Re-Ed/Balance, PT Therapeutic Activity and PT Manual Therapy.    Note: Goal Note filed on 06/14/17 0804 by Brian Olguin, P.T.    Goal: STG-Within one week, patient will maintain dynamic standing  Outcome: NOT MET  Will assess today.            Problem: Mobility     Dates: Start: 06/09/17        Goal: STG-Within one week, patient will ambulate community distances     Dates: Start: 06/14/17      Description: 1) Individualized goal: SPV outdoors and Mod I indoors x1000 feet without LOB, no AD.    2) Interventions: PT Gait Training, PT Therapeutic Exercises, PT Neuro Re-Ed/Balance, PT Therapeutic Activity and PT Manual Therapy.            Goal: STG-Within one week, patient will ambulate up/down flight of stairs     Dates: Start: 06/14/17      Description: 1) Individualized goal: SPV x16 stairs with 1 railing    2) Interventions: PT Gait Training, PT Therapeutic Exercises, PT Neuro Re-Ed/Balance, PT Therapeutic Activity and PT Manual Therapy.              Problem: Mobility Transfers     Dates: Start: 06/09/17       Goal: STG-Within one week, patient will transfer bed to chair     Dates: Start: 06/14/17      Description: 1) Individualized goal: IND without LOB.    2) Interventions: PT Gait Training, PT Therapeutic Exercises, PT Neuro Re-Ed/Balance, PT Therapeutic Activity and PT Manual Therapy.              Problem: PT-Long Term Goals     Dates: Start: 06/09/17       Goal: LTG-By discharge, patient will transfer one surface to another     Dates: Start: 06/09/17      Description: 1) Individualized goal:  Mod I without LOB.    2) Interventions:  PT Gait Training, PT Therapeutic Exercises, PT Neuro Re-Ed/Balance, PT Therapeutic Activity and PT Manual Therapy.            Goal: LTG-By discharge, patient will ambulate up/down flight of stairs     Dates: Start: 06/09/17      Description: 1) Individualized goal:  SPV with 1 railing    2) Interventions:  PT Gait Training, PT Therapeutic Exercises, PT Neuro Re-Ed/Balance, PT Therapeutic Activity and PT Manual Therapy.            Goal: LTG-By discharge, patient will ambulate     Dates: Start: 06/09/17      Description: 1) Individualized goal:  SPV outdoors and Mod I indoors x1000 feet without LOB.    2) Interventions:  PT Gait Training, PT Therapeutic Exercises, PT Neuro  Re-Ed/Balance, PT Therapeutic Activity and PT Manual Therapy.                    Section completed by:  Brian Olguin P.T., DPT

## 2017-06-14 NOTE — REHAB-COLLABORATIVE ONGOING IDT TEAM NOTE
Weekly Interdisciplinary Team Conference Note    Weekly Interdisciplinary Team Conference # 1  Date:  6/14/2017    Clinicians present and reporting at team conference include the following:   MD: Porfirio Fernandes MD   RN:  Fifi Lopez RN   PT:   Ace Olguin, PT, DPT  OT:  Frederick Santos OT   ST:  Isaura Moran MS, CCC-SLP  CM:  KENRICK Young, JASMIN  REC:  Not Applicable  RT:  Not Applicable  RPh:  Zan Robertson [facility]  Other:   Not applicable.   All reporting clinicians have a working knowledge of this patient's plan of care.    Targeted DC Date:   6/24/2017 Saturday   Medical    Patient Active Problem List    Diagnosis Date Noted   • Ischemic stroke (CMS-HCC) 06/09/2017   • History of traumatic brain injury 06/09/2017   • Anomia 06/09/2017   • Aphasia 06/09/2017   • Impaired mobility and ADLs 06/09/2017   • Cognitive and behavioral changes 06/09/2017   • Epidural hematoma (CMS-HCC) 06/08/2017     Results     ** No results found for the last 24 hours. **        Nursing  Diet/Nutrition:  Regular and Thin Liquids  Medication Administration:  Whole with Liquid Wash  % consumed at meals in last 24 hours:  Consumed 100-100% of meals per documentation.  Breakfast:  100%   Lunch:  100%  Dinner:  100%   Snack schedule:  None  Appetite:  Excellent  Admit Weight:  Weight: 52.1 kg (114 lb 13.8 oz)  Weight Last 7 Days   [52.1 kg (114 lb 13.8 oz)-54.2 kg (119 lb 7.8 oz)] 54.2 kg (119 lb 7.8 oz) (06/11 1400)    Pain Issues:    Location:  --  --         Severity:  Denies   Scheduled pain medications:  None     PRN pain medications used in last 24 hours:  None   Non Pharmacologic Interventions:  distraction, emotional support, relaxation, repositioned and rest  Effectiveness of pain management plan:  good=patient states acceptable comfort after interventions    Bowel:    Bowel Assist Initial Score:  5 - Standby Prompting/Supervision or Set-up  Bowel Assist Current Score:  5 - Standby Prompting/Supervision or Set-up  Bowl Accidents  in last 7 days:     Last bowel movement: 06/13/17 (after lunch per pt)  Stool: Medium     Usual bowel pattern:  daily  Scheduled bowel medications:  docusate sodium (COLACE) and senna-docusate (PERICOLACE or SENOKOT S)   PRN bowel medications used in last 24 hours:  None  Nursing Interventions:  Scheduled medication, Supervision  Effectiveness of bowel program:   good=regular, predictable, controlled emptying of bowel     Bladder:    Bladder Assist Initial Score:  5 - Standby Prompting/Supervision or Set-up  Bladder Assist Current Score:  5 - Standby Prompting/Supervision or Set-up  Bladder Accidents in last 7 days:  0  PVR range for past 24-48 hours:  []   Medications affecting bladder:  None    Time void schedule/voiding pattern:  Voiding every 2-4 hours  Interventions:  Supervision, Time void patient initiated  Effectiveness of bladder training:  Good=regular, predictable, emptying of bladder, patient initiates time voiding    Wound:      Patient Lines/Drains/Airways Status    Active Current Wounds     Name: Placement date: Placement time: Site: Days:    Surgical Incision  Incision Left Head 06/08/17      6                   Interventions:  Incision is DELFIN without drainage. Monitoring for s/s of infection.  Effectiveness of intervention:  wound is improving     Sleep/wake cycle:   Average hours slept:  Sleeps 4-6 hours without waking  Sleep medication usage:  Other PRN Trazodone 50mg    Patient/Family Training/Education:  General Self Care, Medication Management, Safe Transfers, Safety and Skin Care  Discharge Supply Recommendations:  Blood Pressure Monitor  Strengths: Willingly participates in therapeutic activities, Able to follow instructions, Supportive family, Pleasant and cooperative, Motivated for self care and independence, Good endurance and Manages pain appropriately   Barriers:   Aphasia expressive and Aphasia receptive            Nursing Problems           Problem: Bowel/Gastric:     Goal:  Normal bowel function is maintained or improved         Goal: Will not experience complications related to bowel motility           Problem: Communication     Goal: The ability to communicate needs accurately and effectively will improve           Problem: Discharge Barriers/Planning     Goal: Patient's continuum of care needs will be met           Problem: Infection     Goal: Will remain free from infection           Problem: Knowledge Deficit     Goal: Knowledge of disease process/condition, treatment plan, diagnostic tests, and medications will improve         Goal: Knowledge of the prescribed therapeutic regimen will improve           Problem: Mobility     Goal: Risk for activity intolerance will decrease           Problem: Pain Management     Goal: Pain level will decrease to patient's comfort goal           Problem: Safety     Goal: Will remain free from injury         Goal: Will remain free from falls           Problem: Venous Thromboembolism (VTW)/Deep Vein Thrombosis (DVT) Prevention:     Goal: Patient will participate in Venous Thrombosis (VTE)/Deep Vein Thrombosis (DVT)Prevention Measures                Long Term Goals:   At discharge patient will be able to function safely at home and in the community with support.    Section completed by:  Azael Mao R.N.    Read and reviewed by Fifi Lopez RN       Mobility  Bed mobility:   Mod I, elevated HOB  Bed /Chair/Wheelchair Transfer Initial:  5 - Standby Prompting/Supervision or Set-up  Bed /Chair/Wheelchair Transfer Current:  6 - Modified Independent   Bed/Chair/Wheelchair Transfer Description:   (elevated HOB, Mod I)  Walk Initial:  4 - Minimal Assistance  Walk Current:  5 - Standby Prompting/Supervision or Set-up   Walk Description:   (Mod I in room; SPV and cueing indoors x800 feet without fatigue noted, no AD)  Wheelchair Initial:     Wheelchair Current:      Wheelchair Description:  n/a due to ambulatory status  Stairs Initial:  4 - Minimal  Assistance  Stairs Current: 5 - Standby Prompting/Supervision or Set-up   Stairs Description:  (verbal cues to terminate task)  Patient/Family Training/Education:  Ongoing patient and father education  DME/DC Recommendations:  1 week; no AD; 24/7 SPV preferred; outpatient PT for balance  Strengths:  Able to follow instructions, Adequate strength, Good endurance, Independent PLOF, Making steady progress towards goals, Motivated for self care and independence, Pleasant and cooperative, Supportive family and Willingly participates in therapeutic activities  Barriers:   Other: mild aphasia and Fair balance  # of short term goals set= 4  # of short term goals met= 3        Physical Therapy Problems           Problem: Balance     Dates: Start: 06/09/17       Goal: STG-Within one week, patient will maintain dynamic standing     Dates: Start: 06/09/17      Description: 1) Individualized goal:  Low fall risk on Rodriguez or DGI balance assessment    2) Interventions:  PT Gait Training, PT Therapeutic Exercises, PT Neuro Re-Ed/Balance, PT Therapeutic Activity and PT Manual Therapy.    Note: Goal Note filed on 06/14/17 0804 by Brian Olguin, P.T.    Goal: STG-Within one week, patient will maintain dynamic standing  Outcome: NOT MET  Will assess today.            Problem: Mobility     Dates: Start: 06/09/17       Goal: STG-Within one week, patient will ambulate community distances     Dates: Start: 06/14/17      Description: 1) Individualized goal: SPV outdoors and Mod I indoors x1000 feet without LOB, no AD.    2) Interventions: PT Gait Training, PT Therapeutic Exercises, PT Neuro Re-Ed/Balance, PT Therapeutic Activity and PT Manual Therapy.            Goal: STG-Within one week, patient will ambulate up/down flight of stairs     Dates: Start: 06/14/17      Description: 1) Individualized goal: SPV x16 stairs with 1 railing    2) Interventions: PT Gait Training, PT Therapeutic Exercises, PT Neuro Re-Ed/Balance, PT Therapeutic  Activity and PT Manual Therapy.              Problem: Mobility Transfers     Dates: Start: 06/09/17       Goal: STG-Within one week, patient will transfer bed to chair     Dates: Start: 06/14/17      Description: 1) Individualized goal: IND without LOB.    2) Interventions: PT Gait Training, PT Therapeutic Exercises, PT Neuro Re-Ed/Balance, PT Therapeutic Activity and PT Manual Therapy.              Problem: PT-Long Term Goals     Dates: Start: 06/09/17       Goal: LTG-By discharge, patient will transfer one surface to another     Dates: Start: 06/09/17      Description: 1) Individualized goal:  Mod I without LOB.    2) Interventions:  PT Gait Training, PT Therapeutic Exercises, PT Neuro Re-Ed/Balance, PT Therapeutic Activity and PT Manual Therapy.            Goal: LTG-By discharge, patient will ambulate up/down flight of stairs     Dates: Start: 06/09/17      Description: 1) Individualized goal:  SPV with 1 railing    2) Interventions:  PT Gait Training, PT Therapeutic Exercises, PT Neuro Re-Ed/Balance, PT Therapeutic Activity and PT Manual Therapy.            Goal: LTG-By discharge, patient will ambulate     Dates: Start: 06/09/17      Description: 1) Individualized goal:  SPV outdoors and Mod I indoors x1000 feet without LOB.    2) Interventions:  PT Gait Training, PT Therapeutic Exercises, PT Neuro Re-Ed/Balance, PT Therapeutic Activity and PT Manual Therapy.                    Section completed by:  Brian Olguin PLiamTLiam, DPT    Activities of Daily Living  Eating Initial:  6 - Modified Independent  Eating Current:  6 - Modified Independent   Eating Description:   (Mod Indep w/ container management, scoop with spoon from bowl, hand to mouth to self feed w/ no spillage)  Grooming Initial:  4 - Minimal Assistance  Grooming Current:  5 - Standby Prompting/Supervision or Set-up   Grooming Description:   (Standing at sink for groom/hygiene routine w/ Min assist for container management and Sup for  safety)  Bathing Initial:  4 - Minimal Assistance  Bathing Current:  5 - Standby Prompting/Supervision or Set-up   Bathing Description:  Verbal cueing  Upper Body Dressing Initial:  5 - Standby Prompting/Supervision or Set-up  Upper Body Dressing Current:  5 - Standby Prompting/Supervision or Set-up   Upper Body Dressing Description:   (pull over shirt, therapist set up and retrieved clothing from closet.)  Lower Body Dressing Initial:  4 - Minimal Assistance  Lower Body Dressing Current:  6 - Modified Independent   Lower Body Dressing Description:  6 - Modified Independent  Toileting Initial:  4 - Minimal Assistance  Toileting Current:  5 - Standby Prompting/Supervision or set-up   Toileting Description:  Increased time, Supervision for safety, Verbal cueing  Toilet Transfer Initial:  5 - Standby Prompting/Supervision or Set-up  Toilet Transfer Current:  5 - Standby Prompting/Supervision or Set-up   Toilet Transfer Description:  5 - Standby Prompting/Supervision or Set-up  Tub / Shower Transfer Initial:  5 - Standby Prompting/Supervision or Set-up  Tub / Shower Transfer Current:  5 - Standby Prompting/Supervision or Set-up   Tub / Shower Transfer Description:  Adaptive equipment, Grab bar, Supervision for safety, Set-up of equipment, Initial preparation for task  IADL:  TBD  Family Training/Education:  TBD   DME/DC Recommendations:  TBD     Strengths:  Independent PLOF, Motivated for self care and independence, Pleasant and cooperative and Willingly participates in therapeutic activities  Barriers:  Aphasia expressive, Aphasia receptive, Generalized weakness and Impulsive     # of short term goals set= 3    # of short term goals met= 3          Occupational Therapy Goals           Problem: Bathing     Dates: Start: 06/09/17       Goal: STG-Within one week, patient will bathe     Dates: Start: 06/14/17      Description: 1) Individualized Goal:  Mod Indep (set up, completion of task, clean up).    2) Interventions:   OT Self Care/ADL, OT Cognitive Skill Dev, OT Neuro Re-Ed/Balance, OT Therapeutic Activity, OT Evaluation and OT Therapeutic Exercise              Problem: IADL's     Dates: Start: 06/14/17       Goal: STG-Within one week, patient will access kitchen area     Dates: Start: 06/14/17      Description: 1) Individualized Goal:  With Sup/SBA to access high/low cabinets, refrigerator and drawers    2) Interventions:  OT Self Care/ADL, OT Cognitive Skill Dev, OT Neuro Re-Ed/Balance, OT Therapeutic Activity, OT Evaluation and OT Therapeutic Exercise            Goal: STG-Within one week, patient will utilize washer and dryer     Dates: Start: 06/14/17      Description: 1) Individualized Goal:  Sup/SBA for set up, completion of task and clean up.    2) Interventions:  OT Self Care/ADL, OT Cognitive Skill Dev, OT Neuro Re-Ed/Balance, OT Therapeutic Activity, OT Evaluation and OT Therapeutic Exercise              Problem: OT Long Term Goals     Dates: Start: 06/09/17       Goal: LTG-By discharge, patient will complete basic self care tasks     Dates: Start: 06/09/17      Description: 1) Individualized Goal:  With Mod Indep for bathroom/hygiene ADL's    2) Interventions:  OT Self Care/ADL, OT Cognitive Skill Dev, OT Manual Ther Technique, OT Neuro Re-Ed/Balance, OT Therapeutic Activity, OT Evaluation and OT Therapeutic Exercise            Goal: LTG-By discharge, patient will perform bathroom transfers     Dates: Start: 06/09/17      Description: 1) Individualized Goal: with or without AE and Mod Indep    2) Interventions:  OT Self Care/ADL, OT Cognitive Skill Dev, OT Manual Ther Technique, OT Neuro Re-Ed/Balance, OT Therapeutic Activity, OT Evaluation and OT Therapeutic Exercise                  Section completed by:  Frederick Santos MS,OTR/L    Cognitive Linquistic Functions  Comprehension Initial:  5 - Stand-by Prompting/Supervision or Set-up  Comprehension Current:  3 - Moderate Assistance   Comprehension Description:  Verbal  cues  Expression Initial:  4 - Minimal Assistance  Expression Current:  3 - Moderate Assistance   Expression Description:  Verbal cueing  Social Interaction Initial:  6 - Modified Independent  Social Interaction Current:  5 - Stand-by Prompting/Supervision or Set-up   Social Interaction Description:  Increased time, Low stim environment, Verbal cues, Schedule  Problem Solving Initial:  3 - Moderate Assistance  Problem Solving Current:  3 - Moderate Assistance   Problem Solving Description:  Verbal cueing, Therapy schedule  Memory Initial:  2 - Max Assistance  Memory Current:  3 - Moderate Assistance   Memory Description:  Verbal cueing, Therapy schedule  Executive Functioning / Organization Initial:     Executive Functioning / Organization Current:   4 minimal assistance    Executive Functioning Desciption: planning/organization   Swallowing  Swallowing Status: Regular diet and thin liquids - not seen for dysphagia management    Orders Placed This Encounter   Procedures   • DIET ORDER     Standing Status: Standing      Number of Occurrences: 1      Standing Expiration Date:      Order Specific Question:  Diet:     Answer:  Regular [1]     Order Specific Question:  Consistency/Fluid modifications:     Answer:  Thin Liquids [3]     Behavior Modification Plan  Allow for rest time, Keep instructions simple/concrete, Give clear feedback, Redirect to task/topic, Provide reasonable choices, Decrease the chance of failure by offering activities that are within the patient's abilities, Analyze tasks (break down into smaller steps) and Reinforce participation in desired tasks  Assistive Technology  Low/Impaired vision equipment and Low tech: Calendar, planner, schedule, alarms/timers, pill organizer, post-it notes, lists  Family Training/Education:  Ongoing with parents and family friends  DC Recommendations:   TBD  Strengths:  Good insight into deficits/needs, Independent PLOF, Motivated for self care and independence,  Pleasant and cooperative, Supportive family and Willingly participates in therapeutic activities  Barriers:  Aphasia expressive, Aphasia receptive, Unable to follow instructions and Impulsive  # of short term goals set=4  # of short term goals met=3        Speech Therapy Problems           Problem: Comprehension STGs     Dates: Start: 06/09/17       Goal: STG-Within one week, patient will     Dates: Start: 06/09/17      Description: 1) Individualized goal:  Follow 2 step directions with 80% accuracy provided minimal assistance.     2) Interventions:  SLP Speech Language Treatment, SLP Self Care / ADL Training  and SLP Cognitive Skill Development        Note: Goal Note filed on 06/13/17 1419 by Isaura Moran MS,CCC-SLP    Goal: STG-Within one week, patient will  Outcome: NOT MET  Mod a for 2 step commands             Problem: Expression STGs     Dates: Start: 06/09/17       Goal: STG-Within one week, patient will     Dates: Start: 06/13/17      Description: 1) Individualized goal:  Describe at least three semantic features when presented with a picture or object with 80% accuracy provided minimal assistance.     2) Interventions:  SLP Speech Language Treatment              Problem: Memory STGs     Dates: Start: 06/09/17       Goal: STG-Within one week, patient will     Dates: Start: 06/13/17      Description: 1) Individualized goal:  Recall information related to hospitalization and daily tasks with 80% accuracy provided minimal assistance.     2) Interventions:  SLP Self Care / ADL Training  and SLP Cognitive Skill Development              Problem: Problem Solving STGs     Dates: Start: 06/13/17       Goal: STG-Within one week, patient will     Dates: Start: 06/13/17      Description: 1) Individualized goal:  Complete alternating and divided attention tasks with 80% accuracy provided minimal assistance.     2) Interventions:  SLP Self Care / ADL Training  and SLP Cognitive Skill Development              Problem:  Speech/Swallowing LTGs     Dates: Start: 06/09/17       Goal: LTG-By discharge, patient will     Dates: Start: 06/09/17      Description: 1) Individualized goal:  Express wants and needs with SPV at time of discharge with 80% accuracy.     2) Interventions:  SLP Speech Language Treatment            Goal: LTG-By discharge, patient will     Dates: Start: 06/09/17      Description: 1) Individualized goal:  Recall information and complete functional problem solving tasks with 80% accuracy provided SPV    2) Interventions:  SLP Speech Language Treatment                   Section completed by:  Isaura Moran MS,[facility]-SLP          REHAB-Pharmacy IDT Team Note by Leatha Arce RPH at 6/13/2017 10:15 AM  Version 1 of 1    Author:  Leatha Arce RPH Service:  (none) Author Type:  Pharmacist    Filed:  6/13/2017 10:18 AM Note Time:  6/13/2017 10:15 AM Status:  Signed    :  Leatha Arce RPH (Pharmacist)           Pharmacy  Pharmacy  Antibiotics/Antifungals/Antivirals:  Medication:      Active Orders     None        Route:         n/a  Stop Date:  n/a  Reason:   Antihypertensives/Cardiac:  Medication:    Orders     None        Patient Vitals for the past 24 hrs:   BP Pulse   06/13/17 0645 104/65 mmHg 94   06/12/17 1900 116/70 mmHg 100   06/12/17 1400 100/67 mmHg 93       Anticoagulation:  Medication:  n/a  INR:      Other key medications: Depakote, levetiracetam, trazodone.     A review of the medication list reveals no issues at this time.  Section completed by:  Leatha Arce RPH           DC Planning  DC destination/dispostion:  Return home w/ his parents where they live in [place], [place].      Referrals: [facility] due to leg discrepency.      DC Needs: Anticipate out pt therapy; follow up with PCP/Neurosurgeon     Barriers to discharge: None       Strengths:  Pleasant, cooperative, making gains with therapy; supportive family    Section completed by:  KENRICK Lugo, JASMIN   Out  patient speech therapy; md to consider starting meds for memory. Dc planned for sat 6/24/2017    Physician Summary  Porfirio Fernandes MD participated and led team conference discussion.

## 2017-06-14 NOTE — PROGRESS NOTES
"Rehab Progress Note     Interval Events (Subjective)  Patient was seen in his room today. He has no complaints and he feels he is doing well        Nursing    No issues  Whole pills without water     PT  Working on ambulation with supervision indoors 800 feet yesterday  Endurance not an issue  No LOB no asssitive device  Leg length discrepancy will need to be addressed with an orthotic. I have discussed this with the patient and his father    OT    Mod I in  For transfers in room  Mod I for dressing grooming and hygiene  Mod I in room    SLP  Improving  C and E mod assist  SI sueprvision  Problems solving and memory mod asssit    Executive min assist  [place] 17 yesterday  Does well with structure    May consider methylphenidate  May consdier lowering keppra    Will adjust therapy     SLP  2 hours  30 PT and OT    Tentative discharge 6/24/2017        Objective:  VITAL SIGNS: /60 mmHg  Pulse 95  Temp(Src) 36.8 °C (98.2 °F)  Resp 18  Ht 1.689 m (5' 6.5\")  Wt 54.2 kg (119 lb 7.8 oz)  BMI 19.00 kg/m2  SpO2 97%      Cardiac: regular rate and rhythm, nl S1/S2    Lungs: clear to auscultation bilaterally.    Abdomen: soft; NT, ND, BS present.    Extremities: Without clubbing cyanosis or edema  Neuro improved cognition. Balance and overall function significantly improved. Language remains his major issue expressive > than receptive           No results found for this or any previous visit (from the past 72 hour(s)).    Current Facility-Administered Medications   Medication Frequency   • levetiracetam (KEPPRA) tablet 1,000 mg Q12HRS   • divalproex (DEPAKOTE) delayed-release tablet 500 mg QHS   • oxycodone-acetaminophen (PERCOCET-10)  MG per tablet 1 Tab Q6HRS PRN   • trazodone (DESYREL) tablet 50 mg QHS PRN   • Respiratory Care per Protocol Continuous RT   • acetaminophen (TYLENOL) tablet 650 mg Q4HRS PRN   • ondansetron (ZOFRAN ODT) dispertab 4 mg 4X/DAY PRN    Or   • ondansetron (ZOFRAN) syringe/vial " injection 4 mg 4X/DAY PRN   • docusate sodium (COLACE) capsule 100 mg DAILY    And   • senna-docusate (PERICOLACE or SENOKOT S) 8.6-50 MG per tablet 1 Tab Q EVENING    And   • lactulose 20 GM/30ML solution 30 mL PRN    And   • bisacodyl (DULCOLAX) suppository 10 mg PRN    And   • fleet enema 133 mL PRN   • simethicone (MYLICON) chewable tab 80 mg TID PRN       Orders Placed This Encounter   Procedures   • DIET ORDER     Standing Status: Standing      Number of Occurrences: 1      Standing Expiration Date:      Order Specific Question:  Diet:     Answer:  Regular [1]     Order Specific Question:  Consistency/Fluid modifications:     Answer:  Thin Liquids [3]       Assessment:  Active Hospital Problems    Diagnosis   • *Ischemic stroke (CMS-HCC)   • History of traumatic brain injury   • Anomia   • Aphasia   • Impaired mobility and ADLs   • Cognitive and behavioral changes   • Epidural hematoma (CMS-HCC)       Medical Decision Making and Plan:    Doing well  Will continue PT, OT and SLP  Greatest deficits are in language and cognition  Tentative discharge date set for 6/24/2017  Will reconference next week 6/21/2017    Team Conference      Nursing  Diet/Nutrition:  Regular and Thin Liquids  Medication Administration:  Whole with Liquid Wash  % consumed at meals in last 24 hours:  Consumed 100-100% of meals per documentation.  Breakfast:  100%    Lunch:  100%  Dinner:  100%    Snack schedule:  None  Appetite:  Excellent  Admit Weight:  Weight: 52.1 kg (114 lb 13.8 oz)  Weight Last 7 Days   [52.1 kg (114 lb 13.8 oz)-54.2 kg (119 lb 7.8 oz)] 54.2 kg (119 lb 7.8 oz) (06/11 1400)    Pain Issues:     Location:  --  --         Severity:  Denies    Scheduled pain medications:  None     PRN pain medications used in last 24 hours:  None   Non Pharmacologic Interventions:  distraction, emotional support, relaxation, repositioned and rest  Effectiveness of pain management plan:  good=patient states acceptable comfort after  interventions    Bowel:    Bowel Assist Initial Score:  5 - Standby Prompting/Supervision or Set-up  Bowel Assist Current Score:  5 - Standby Prompting/Supervision or Set-up  Bowl Accidents in last 7 days:     Last bowel movement: 06/13/17 (after lunch per pt)  Stool: Medium      Usual bowel pattern:  daily  Scheduled bowel medications:  docusate sodium (COLACE) and senna-docusate (PERICOLACE or SENOKOT S)    PRN bowel medications used in last 24 hours:  None  Nursing Interventions:  Scheduled medication, Supervision  Effectiveness of bowel program:   good=regular, predictable, controlled emptying of bowel     Bladder:    Bladder Assist Initial Score:  5 - Standby Prompting/Supervision or Set-up  Bladder Assist Current Score:  5 - Standby Prompting/Supervision or Set-up  Bladder Accidents in last 7 days:  0  PVR range for past 24-48 hours:  []    Medications affecting bladder:  None    Time void schedule/voiding pattern:  Voiding every 2-4 hours  Interventions:  Supervision, Time void patient initiated  Effectiveness of bladder training:  Good=regular, predictable, emptying of bladder, patient initiates time voiding    Wound:       Patient Lines/Drains/Airways Status     Active Current Wounds       Name:  Placement date:  Placement time:  Site:  Days:      Surgical Incision  Incision Left Head  06/08/17         6                        Interventions:  Incision is DELFIN without drainage. Monitoring for s/s of infection.  Effectiveness of intervention:  wound is improving     Sleep/wake cycle:    Average hours slept:  Sleeps 4-6 hours without waking  Sleep medication usage:  Other PRN Trazodone 50mg    Patient/Family Training/Education:  General Self Care, Medication Management, Safe Transfers, Safety and Skin Care  Discharge Supply Recommendations:  Blood Pressure Monitor  Strengths: Willingly participates in therapeutic activities, Able to follow instructions, Supportive family, Pleasant and cooperative,  Motivated for self care and independence, Good endurance and Manages pain appropriately   Barriers:   Aphasia expressive and Aphasia receptive               Nursing Problems              Problem: Bowel/Gastric:       Goal: Normal bowel function is maintained or improved              Goal: Will not experience complications related to bowel motility                Problem: Communication       Goal: The ability to communicate needs accurately and effectively will improve                Problem: Discharge Barriers/Planning       Goal: Patient's continuum of care needs will be met                Problem: Infection       Goal: Will remain free from infection                Problem: Knowledge Deficit       Goal: Knowledge of disease process/condition, treatment plan, diagnostic tests, and medications will improve              Goal: Knowledge of the prescribed therapeutic regimen will improve                Problem: Mobility       Goal: Risk for activity intolerance will decrease                Problem: Pain Management       Goal: Pain level will decrease to patient's comfort goal                Problem: Safety       Goal: Will remain free from injury              Goal: Will remain free from falls                Problem: Venous Thromboembolism (VTW)/Deep Vein Thrombosis (DVT) Prevention:       Goal: Patient will participate in Venous Thrombosis (VTE)/Deep Vein Thrombosis (DVT)Prevention Measures                     Long Term Goals:   At discharge patient will be able to function safely at home and in the community with support.    Section completed by:  Azael Mao R.N.    Read and reviewed by Fifi Lopez RN       Mobility  Bed mobility:   Mod I, elevated HOB  Bed /Chair/Wheelchair Transfer Initial:  5 - Standby Prompting/Supervision or Set-up  Bed /Chair/Wheelchair Transfer Current:  6 - Modified Independent              Bed/Chair/Wheelchair Transfer Description:   (elevated HOB, Mod I)  Walk Initial:  4 - Minimal  Assistance  Walk Current:  5 - Standby Prompting/Supervision or Set-up              Walk Description:   (Mod I in room; SPV and cueing indoors x800 feet without fatigue noted, no AD)  Wheelchair Initial:     Wheelchair Current:                 Wheelchair Description:  n/a due to ambulatory status  Stairs Initial:  4 - Minimal Assistance  Stairs Current: 5 - Standby Prompting/Supervision or Set-up              Stairs Description:  (verbal cues to terminate task)  Patient/Family Training/Education:  Ongoing patient and father education  DME/DC Recommendations:  1 week; no AD; 24/7 SPV preferred; outpatient PT for balance  Strengths:  Able to follow instructions, Adequate strength, Good endurance, Independent PLOF, Making steady progress towards goals, Motivated for self care and independence, Pleasant and cooperative, Supportive family and Willingly participates in therapeutic activities  Barriers:   Other: mild aphasia and Fair balance  # of short term goals set= 4  # of short term goals met= 3         Physical Therapy Problems              Problem: Balance       Dates:  Start: 06/09/17         Goal: STG-Within one week, patient will maintain dynamic standing        Dates:  Start: 06/09/17       Description:  1) Individualized goal:  Low fall risk on Rodriguez or DGI balance assessment      2) Interventions:  PT Gait Training, PT Therapeutic Exercises, PT Neuro Re-Ed/Balance, PT Therapeutic Activity and PT Manual Therapy.     Note:  Goal Note filed on 06/14/17 0804 by Brian Olguin, P.T.      Goal: STG-Within one week, patient will maintain dynamic standing  Outcome: NOT MET  Will assess today.               Problem: Mobility       Dates:  Start: 06/09/17         Goal: STG-Within one week, patient will ambulate community distances        Dates:  Start: 06/14/17       Description:  1) Individualized goal: SPV outdoors and Mod I indoors x1000 feet without LOB, no AD.      2) Interventions: PT Gait Training, PT  Therapeutic Exercises, PT Neuro Re-Ed/Balance, PT Therapeutic Activity and PT Manual Therapy.                 Goal: STG-Within one week, patient will ambulate up/down flight of stairs        Dates:  Start: 06/14/17       Description:  1) Individualized goal: SPV x16 stairs with 1 railing      2) Interventions: PT Gait Training, PT Therapeutic Exercises, PT Neuro Re-Ed/Balance, PT Therapeutic Activity and PT Manual Therapy.                   Problem: Mobility Transfers       Dates:  Start: 06/09/17         Goal: STG-Within one week, patient will transfer bed to chair        Dates:  Start: 06/14/17       Description:  1) Individualized goal: IND without LOB.      2) Interventions: PT Gait Training, PT Therapeutic Exercises, PT Neuro Re-Ed/Balance, PT Therapeutic Activity and PT Manual Therapy.                   Problem: PT-Long Term Goals       Dates:  Start: 06/09/17         Goal: LTG-By discharge, patient will transfer one surface to another        Dates:  Start: 06/09/17       Description:  1) Individualized goal:  Mod I without LOB.      2) Interventions:  PT Gait Training, PT Therapeutic Exercises, PT Neuro Re-Ed/Balance, PT Therapeutic Activity and PT Manual Therapy.                 Goal: LTG-By discharge, patient will ambulate up/down flight of stairs        Dates:  Start: 06/09/17       Description:  1) Individualized goal:  SPV with 1 railing      2) Interventions:  PT Gait Training, PT Therapeutic Exercises, PT Neuro Re-Ed/Balance, PT Therapeutic Activity and PT Manual Therapy.                 Goal: LTG-By discharge, patient will ambulate        Dates:  Start: 06/09/17       Description:  1) Individualized goal:  SPV outdoors and Mod I indoors x1000 feet without LOB.      2) Interventions:  PT Gait Training, PT Therapeutic Exercises, PT Neuro Re-Ed/Balance, PT Therapeutic Activity and PT Manual Therapy.                         Section completed by:  Brian Olguin PLiamTLiam, DPT    Activities of Daily  Living  Eating Initial:  6 - Modified Independent  Eating Current:  6 - Modified Independent              Eating Description:   (Mod Indep w/ container management, scoop with spoon from bowl, hand to mouth to self feed w/ no spillage)  Grooming Initial:  4 - Minimal Assistance  Grooming Current:  5 - Standby Prompting/Supervision or Set-up              Grooming Description:   (Standing at sink for groom/hygiene routine w/ Min assist for container management and Sup for safety)  Bathing Initial:  4 - Minimal Assistance  Bathing Current:  5 - Standby Prompting/Supervision or Set-up              Bathing Description:  Verbal cueing  Upper Body Dressing Initial:  5 - Standby Prompting/Supervision or Set-up  Upper Body Dressing Current:  5 - Standby Prompting/Supervision or Set-up              Upper Body Dressing Description:   (pull over shirt, therapist set up and retrieved clothing from closet.)  Lower Body Dressing Initial:  4 - Minimal Assistance  Lower Body Dressing Current:  6 - Modified Independent              Lower Body Dressing Description:  6 - Modified Independent  Toileting Initial:  4 - Minimal Assistance  Toileting Current:  5 - Standby Prompting/Supervision or set-up              Toileting Description:  Increased time, Supervision for safety, Verbal cueing  Toilet Transfer Initial:  5 - Standby Prompting/Supervision or Set-up  Toilet Transfer Current:  5 - Standby Prompting/Supervision or Set-up              Toilet Transfer Description:  5 - Standby Prompting/Supervision or Set-up  Tub / Shower Transfer Initial:  5 - Standby Prompting/Supervision or Set-up  Tub / Shower Transfer Current:  5 - Standby Prompting/Supervision or Set-up              Tub / Shower Transfer Description:  Adaptive equipment, Grab bar, Supervision for safety, Set-up of equipment, Initial preparation for task  IADL:  TBD  Family Training/Education:  TBD   DME/DC Recommendations:  TBD     Strengths:  Independent PLOF, Motivated for  self care and independence, Pleasant and cooperative and Willingly participates in therapeutic activities  Barriers:  Aphasia expressive, Aphasia receptive, Generalized weakness and Impulsive     # of short term goals set= 3    # of short term goals met= 3           Occupational Therapy Goals              Problem: Bathing       Dates:  Start: 06/09/17         Goal: STG-Within one week, patient will bathe        Dates:  Start: 06/14/17       Description:  1) Individualized Goal:  Mod Indep (set up, completion of task, clean up).      2) Interventions:  OT Self Care/ADL, OT Cognitive Skill Dev, OT Neuro Re-Ed/Balance, OT Therapeutic Activity, OT Evaluation and OT Therapeutic Exercise                   Problem: IADL's       Dates:  Start: 06/14/17         Goal: STG-Within one week, patient will access kitchen area        Dates:  Start: 06/14/17       Description:  1) Individualized Goal:  With Sup/SBA to access high/low cabinets, refrigerator and drawers      2) Interventions:  OT Self Care/ADL, OT Cognitive Skill Dev, OT Neuro Re-Ed/Balance, OT Therapeutic Activity, OT Evaluation and OT Therapeutic Exercise                 Goal: STG-Within one week, patient will utilize washer and dryer        Dates:  Start: 06/14/17       Description:  1) Individualized Goal:  Sup/SBA for set up, completion of task and clean up.      2) Interventions:  OT Self Care/ADL, OT Cognitive Skill Dev, OT Neuro Re-Ed/Balance, OT Therapeutic Activity, OT Evaluation and OT Therapeutic Exercise                   Problem: OT Long Term Goals       Dates:  Start: 06/09/17         Goal: LTG-By discharge, patient will complete basic self care tasks        Dates:  Start: 06/09/17       Description:  1) Individualized Goal:  With Mod Indep for bathroom/hygiene ADL's      2) Interventions:  OT Self Care/ADL, OT Cognitive Skill Dev, OT Manual Ther Technique, OT Neuro Re-Ed/Balance, OT Therapeutic Activity, OT Evaluation and OT Therapeutic Exercise                  Goal: LTG-By discharge, patient will perform bathroom transfers        Dates:  Start: 06/09/17       Description:  1) Individualized Goal: with or without AE and Mod Indep      2) Interventions:  OT Self Care/ADL, OT Cognitive Skill Dev, OT Manual Ther Technique, OT Neuro Re-Ed/Balance, OT Therapeutic Activity, OT Evaluation and OT Therapeutic Exercise                       Section completed by:  Frederick Santos MS,OTR/L    Cognitive Linquistic Functions  Comprehension Initial:  5 - Stand-by Prompting/Supervision or Set-up  Comprehension Current:  3 - Moderate Assistance              Comprehension Description:  Verbal cues  Expression Initial:  4 - Minimal Assistance  Expression Current:  3 - Moderate Assistance              Expression Description:  Verbal cueing  Social Interaction Initial:  6 - Modified Independent  Social Interaction Current:  5 - Stand-by Prompting/Supervision or Set-up              Social Interaction Description:  Increased time, Low stim environment, Verbal cues, Schedule  Problem Solving Initial:  3 - Moderate Assistance  Problem Solving Current:  3 - Moderate Assistance              Problem Solving Description:  Verbal cueing, Therapy schedule  Memory Initial:  2 - Max Assistance  Memory Current:  3 - Moderate Assistance              Memory Description:  Verbal cueing, Therapy schedule  Executive Functioning / Organization Initial:     Executive Functioning / Organization Current:   4 minimal assistance                Executive Functioning Desciption: planning/organization   Swallowing  Swallowing Status: Regular diet and thin liquids - not seen for dysphagia management    Orders Placed This Encounter    Procedures    •  DIET ORDER        Standing Status:  Standing          Number of Occurrences:  1          Standing Expiration Date:          Order Specific Question:   Diet:        Answer:   Regular [1]        Order Specific Question:   Consistency/Fluid modifications:        Answer:    Thin Liquids [3]      Behavior Modification Plan  Allow for rest time, Keep instructions simple/concrete, Give clear feedback, Redirect to task/topic, Provide reasonable choices, Decrease the chance of failure by offering activities that are within the patient's abilities, Analyze tasks (break down into smaller steps) and Reinforce participation in desired tasks  Assistive Technology  Low/Impaired vision equipment and Low tech: Calendar, planner, schedule, alarms/timers, pill organizer, post-it notes, lists  Family Training/Education:  Ongoing with parents and family friends  DC Recommendations:   TBD  Strengths:  Good insight into deficits/needs, Independent PLOF, Motivated for self care and independence, Pleasant and cooperative, Supportive family and Willingly participates in therapeutic activities  Barriers:  Aphasia expressive, Aphasia receptive, Unable to follow instructions and Impulsive  # of short term goals set=4  # of short term goals met=3         Speech Therapy Problems              Problem: Comprehension STGs       Dates:  Start: 06/09/17         Goal: STG-Within one week, patient will        Dates:  Start: 06/09/17       Description:  1) Individualized goal:  Follow 2 step directions with 80% accuracy provided minimal assistance.       2) Interventions:  SLP Speech Language Treatment, SLP Self Care / ADL Training  and SLP Cognitive Skill Development           Note:  Goal Note filed on 06/13/17 1419 by Isaura Moran MS,CCC-SLP      Goal: STG-Within one week, patient will  Outcome: NOT MET  Mod a for 2 step commands                 Problem: Expression STGs       Dates:  Start: 06/09/17         Goal: STG-Within one week, patient will        Dates:  Start: 06/13/17       Description:  1) Individualized goal:  Describe at least three semantic features when presented with a picture or object with 80% accuracy provided minimal assistance.       2) Interventions:  SLP Speech Language Treatment                    Problem: Memory STGs       Dates:  Start: 06/09/17         Goal: STG-Within one week, patient will        Dates:  Start: 06/13/17       Description:  1) Individualized goal:  Recall information related to hospitalization and daily tasks with 80% accuracy provided minimal assistance.       2) Interventions:  SLP Self Care / ADL Training  and SLP Cognitive Skill Development                   Problem: Problem Solving STGs       Dates:  Start: 06/13/17         Goal: STG-Within one week, patient will        Dates:  Start: 06/13/17       Description:  1) Individualized goal:  Complete alternating and divided attention tasks with 80% accuracy provided minimal assistance.       2) Interventions:  SLP Self Care / ADL Training  and SLP Cognitive Skill Development                   Problem: Speech/Swallowing LTGs       Dates:  Start: 06/09/17         Goal: LTG-By discharge, patient will        Dates:  Start: 06/09/17       Description:  1) Individualized goal:  Express wants and needs with SPV at time of discharge with 80% accuracy.       2) Interventions:  SLP Speech Language Treatment                 Goal: LTG-By discharge, patient will        Dates:  Start: 06/09/17       Description:  1) Individualized goal:  Recall information and complete functional problem solving tasks with 80% accuracy provided SPV      2) Interventions:  SLP Speech Language Treatment                        Section completed by:  Isaura Moran MS,[facility]-SLP          REHAB-Pharmacy IDT Team Note by Leatha Arce RPH at 6/13/2017 10:15 AM   Version 1 of 1      Author:  Leatha Arce RPH  Service:  (none)  Author Type:  Pharmacist      Filed:  6/13/2017 10:18 AM  Note Time:  6/13/2017 10:15 AM  Status:  Signed      :  Leatha Arce RPH (Pharmacist)                Pharmacy  Pharmacy  Antibiotics/Antifungals/Antivirals:  Medication:      Active Orders       None          Route:         n/a  Stop Date:  n/a  Reason:     Antihypertensives/Cardiac:  Medication:    Orders       None          Patient Vitals for the past 24 hrs:    BP  Pulse    06/13/17 0645  104/65 mmHg  94    06/12/17 1900  116/70 mmHg  100    06/12/17 1400  100/67 mmHg  93        Anticoagulation:  Medication:  n/a  INR:      Other key medications: Depakote, levetiracetam, trazodone.     A review of the medication list reveals no issues at this time.  Section completed by:  Leatha Arce [facility]              DC Planning  DC destination/dispostion:  Return home w/ his parents where they live in [place], [place].       Referrals: PT to refer to [facility] due to leg discrepency.        DC Needs: Anticipate out pt therapy; follow up with PCP/Neurosurgeon     Barriers to discharge: None        Strengths:  Pleasant, cooperative, making gains with therapy; supportive family    Section completed by:  KENRICK Lugo, [facility]   Out patient speech therapy; md to consider starting meds for memory. Dc planned for sat 6/24/2017    Physician Summary  I participated and led team conference discussion            Total time: > 35  minutes.  I spent greater than 50% of the time for patient care and coordination on this date, including unit/floor time, and face-to-face time with the patient as per assessment and plan above.    Porfirio Fernandes M.D.

## 2017-06-14 NOTE — REHAB-OT IDT TEAM NOTE
Occupational Therapy  Activities of Daily Living  Eating Initial:  6 - Modified Independent  Eating Current:  6 - Modified Independent   Eating Description:   (Mod Indep w/ container management, scoop with spoon from bowl, hand to mouth to self feed w/ no spillage)  Grooming Initial:  4 - Minimal Assistance  Grooming Current:  5 - Standby Prompting/Supervision or Set-up   Grooming Description:   (Standing at sink for groom/hygiene routine w/ Min assist for container management and Sup for safety)  Bathing Initial:  4 - Minimal Assistance  Bathing Current:  5 - Standby Prompting/Supervision or Set-up   Bathing Description:  Verbal cueing  Upper Body Dressing Initial:  5 - Standby Prompting/Supervision or Set-up  Upper Body Dressing Current:  5 - Standby Prompting/Supervision or Set-up   Upper Body Dressing Description:   (pull over shirt, therapist set up and retrieved clothing from closet.)  Lower Body Dressing Initial:  4 - Minimal Assistance  Lower Body Dressing Current:  6 - Modified Independent   Lower Body Dressing Description:  6 - Modified Independent  Toileting Initial:  4 - Minimal Assistance  Toileting Current:  5 - Standby Prompting/Supervision or set-up   Toileting Description:  Increased time, Supervision for safety, Verbal cueing  Toilet Transfer Initial:  5 - Standby Prompting/Supervision or Set-up  Toilet Transfer Current:  5 - Standby Prompting/Supervision or Set-up   Toilet Transfer Description:  5 - Standby Prompting/Supervision or Set-up  Tub / Shower Transfer Initial:  5 - Standby Prompting/Supervision or Set-up  Tub / Shower Transfer Current:  5 - Standby Prompting/Supervision or Set-up   Tub / Shower Transfer Description:  Adaptive equipment, Grab bar, Supervision for safety, Set-up of equipment, Initial preparation for task  IADL:  TBD  Family Training/Education:  TBD   DME/DC Recommendations:  TBD     Strengths:  Independent PLOF, Motivated for self care and independence, Pleasant and  cooperative and Willingly participates in therapeutic activities  Barriers:  Aphasia expressive, Aphasia receptive, Generalized weakness and Impulsive     # of short term goals set= 3    # of short term goals met= 3          Occupational Therapy Goals           Problem: Bathing     Dates: Start: 06/09/17       Goal: STG-Within one week, patient will bathe     Dates: Start: 06/14/17      Description: 1) Individualized Goal:  Mod Indep (set up, completion of task, clean up).    2) Interventions:  OT Self Care/ADL, OT Cognitive Skill Dev, OT Neuro Re-Ed/Balance, OT Therapeutic Activity, OT Evaluation and OT Therapeutic Exercise              Problem: IADL's     Dates: Start: 06/14/17       Goal: STG-Within one week, patient will access kitchen area     Dates: Start: 06/14/17      Description: 1) Individualized Goal:  With Sup/SBA to access high/low cabinets, refrigerator and drawers    2) Interventions:  OT Self Care/ADL, OT Cognitive Skill Dev, OT Neuro Re-Ed/Balance, OT Therapeutic Activity, OT Evaluation and OT Therapeutic Exercise            Goal: STG-Within one week, patient will utilize washer and dryer     Dates: Start: 06/14/17      Description: 1) Individualized Goal:  Sup/SBA for set up, completion of task and clean up.    2) Interventions:  OT Self Care/ADL, OT Cognitive Skill Dev, OT Neuro Re-Ed/Balance, OT Therapeutic Activity, OT Evaluation and OT Therapeutic Exercise              Problem: OT Long Term Goals     Dates: Start: 06/09/17       Goal: LTG-By discharge, patient will complete basic self care tasks     Dates: Start: 06/09/17      Description: 1) Individualized Goal:  With Mod Indep for bathroom/hygiene ADL's    2) Interventions:  OT Self Care/ADL, OT Cognitive Skill Dev, OT Manual Ther Technique, OT Neuro Re-Ed/Balance, OT Therapeutic Activity, OT Evaluation and OT Therapeutic Exercise            Goal: LTG-By discharge, patient will perform bathroom transfers     Dates: Start: 06/09/17       Description: 1) Individualized Goal: with or without AE and Mod Indep    2) Interventions:  OT Self Care/ADL, OT Cognitive Skill Dev, OT Manual Ther Technique, OT Neuro Re-Ed/Balance, OT Therapeutic Activity, OT Evaluation and OT Therapeutic Exercise                  Section completed by:  Frederick Santos MS,OTR/L

## 2017-06-14 NOTE — CARE PLAN
Problem: Communication  Goal: The ability to communicate needs accurately and effectively will improve  Outcome: PROGRESSING SLOWER THAN EXPECTED  Pt is aphasic and needs extra time to express himself effectively to staff.    Problem: Safety  Goal: Will remain free from injury  Outcome: PROGRESSING AS EXPECTED  Pt uses call light consistently for staff assistance. Pt has good safety awareness, no impulsivity observed.    Problem: Infection  Goal: Will remain free from infection  Outcome: PROGRESSING AS EXPECTED  No s/s of infection present    Problem: Bowel/Gastric:  Goal: Will not experience complications related to bowel motility  Outcome: PROGRESSING AS EXPECTED  Pt is continent of bowel and reports having daily BMs. Pt refused scheduled HS Pericolace.

## 2017-06-14 NOTE — DISCHARGE PLANNING
"CASE MANAGEMENT INITIAL ASSESSMENT    Admit Date:  6/8/2017     I met with pt and his dad, Donto discuss role of case management / discharge planning / team conference. Patient is a  21 y.o. male transferred from [facility] where he was hospitalized from 5/27/2017 to 6//2017.  Due to patient's aphasia, his dad provided most of the information for assessment.     Accepting MD to [facility] is Dr. Fernandes    Diagnosis: Other Neuro  Epidural hematoma (CMS-HCC)    Co-morbidities:   Patient Active Problem List    Diagnosis Date Noted   • Ischemic stroke (CMS-HCC) 06/09/2017   • History of traumatic brain injury 06/09/2017   • Anomia 06/09/2017   • Aphasia 06/09/2017   • Impaired mobility and ADLs 06/09/2017   • Cognitive and behavioral changes 06/09/2017   • Epidural hematoma (CMS-HCC) 06/08/2017     Prior Living Situation:  Housing / Facility:Apartment on  second story above parent's business which is a  [employer]  Lives with - Patient's Self Care Capacity: Alone and Able to Care For Self    Prior Level of Function:  Medication Management: Has not been taking any medication prior to surgery.    Finances: Indepependent  Home Management: Independent  Shopping: Independent  Prior Level Of Mobility: Independent Without Device in Community, Independent With Steps in Community, Independent Without Device in Home, Independent With Steps in Home  Driving / Transportation: Driving Independent    Support Systems:  Primary Contact:  Don Byrd Dad  and mom Thao Byrd    Other support systems: Aunt-Kiah who lives in [place]       Previous Services Utilized:   Equipment Owned: None  Prior Services: None      Other Information:   Primary Care:   None-if needs medical attention, they go to a \"[facility]\".   Other Md's:  Dr. Larry Neur; Dr. White Neurologyist at [place]   Pharmacy:  None-has not been on any medications.  Will use [facility] on [facility] [facility]         Patient / " Family Goal:   Goal is for pt to return home back home to [place], [place].  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  His parents are able to assist w/ the transition home.  They are currently staying with pt's aunt who lives in [place].         Plan:  1. Continue to follow patient through hospitalization and provide discharge planning in collaboration with patient, family, physicians and ancillary services.     2. Utilize community resources to ensure a safe discharge. Anticipate out pt therapy.

## 2017-06-14 NOTE — PROGRESS NOTES
Assumed care of patient, received report from Night Shift RN. Assessment completed. A/O x4. Patient denies pain. Call light within reach, educated about fall and safety precautions, bed alarm is on and in use. No complaints at this time. Hourly rounding in place. All needs met. Dad at bedside.

## 2017-06-14 NOTE — REHAB-NURSING IDT TEAM NOTE
Nursing  Nursing  Diet/Nutrition:  Regular and Thin Liquids  Medication Administration:  Whole with Liquid Wash  % consumed at meals in last 24 hours:  Consumed 100-100% of meals per documentation.  Breakfast:  100%   Lunch:  100%  Dinner:  100%   Snack schedule:  None  Appetite:  Excellent  Admit Weight:  Weight: 52.1 kg (114 lb 13.8 oz)  Weight Last 7 Days   [52.1 kg (114 lb 13.8 oz)-54.2 kg (119 lb 7.8 oz)] 54.2 kg (119 lb 7.8 oz) (06/11 1400)    Pain Issues:    Location:  --  --         Severity:  Denies   Scheduled pain medications:  None     PRN pain medications used in last 24 hours:  None   Non Pharmacologic Interventions:  distraction, emotional support, relaxation, repositioned and rest  Effectiveness of pain management plan:  good=patient states acceptable comfort after interventions    Bowel:    Bowel Assist Initial Score:  5 - Standby Prompting/Supervision or Set-up  Bowel Assist Current Score:  5 - Standby Prompting/Supervision or Set-up  Bowl Accidents in last 7 days:     Last bowel movement: 06/13/17 (after lunch per pt)  Stool: Medium     Usual bowel pattern:  daily  Scheduled bowel medications:  docusate sodium (COLACE) and senna-docusate (PERICOLACE or SENOKOT S)   PRN bowel medications used in last 24 hours:  None  Nursing Interventions:  Scheduled medication, Supervision  Effectiveness of bowel program:   good=regular, predictable, controlled emptying of bowel     Bladder:    Bladder Assist Initial Score:  5 - Standby Prompting/Supervision or Set-up  Bladder Assist Current Score:  5 - Standby Prompting/Supervision or Set-up  Bladder Accidents in last 7 days:  0  PVR range for past 24-48 hours:  []   Medications affecting bladder:  None    Time void schedule/voiding pattern:  Voiding every 2-4 hours  Interventions:  Supervision, Time void patient initiated  Effectiveness of bladder training:  Good=regular, predictable, emptying of bladder, patient initiates time voiding    Wound:       Patient Lines/Drains/Airways Status    Active Current Wounds     Name: Placement date: Placement time: Site: Days:    Surgical Incision  Incision Left Head 06/08/17      6                   Interventions:  Incision is DELFIN without drainage. Monitoring for s/s of infection.  Effectiveness of intervention:  wound is improving     Sleep/wake cycle:   Average hours slept:  Sleeps 4-6 hours without waking  Sleep medication usage:  Other PRN Trazodone 50mg    Patient/Family Training/Education:  General Self Care, Medication Management, Safe Transfers, Safety and Skin Care  Discharge Supply Recommendations:  Blood Pressure Monitor  Strengths: Willingly participates in therapeutic activities, Able to follow instructions, Supportive family, Pleasant and cooperative, Motivated for self care and independence, Good endurance and Manages pain appropriately   Barriers:   Aphasia expressive and Aphasia receptive            Nursing Problems           Problem: Bowel/Gastric:     Goal: Normal bowel function is maintained or improved         Goal: Will not experience complications related to bowel motility           Problem: Communication     Goal: The ability to communicate needs accurately and effectively will improve           Problem: Discharge Barriers/Planning     Goal: Patient's continuum of care needs will be met           Problem: Infection     Goal: Will remain free from infection           Problem: Knowledge Deficit     Goal: Knowledge of disease process/condition, treatment plan, diagnostic tests, and medications will improve         Goal: Knowledge of the prescribed therapeutic regimen will improve           Problem: Mobility     Goal: Risk for activity intolerance will decrease           Problem: Pain Management     Goal: Pain level will decrease to patient's comfort goal           Problem: Safety     Goal: Will remain free from injury         Goal: Will remain free from falls           Problem: Venous Thromboembolism  (VTW)/Deep Vein Thrombosis (DVT) Prevention:     Goal: Patient will participate in Venous Thrombosis (VTE)/Deep Vein Thrombosis (DVT)Prevention Measures                Long Term Goals:   At discharge patient will be able to function safely at home and in the community with support.    Section completed by:  Azael Mao R.N.    Read and reviewed by Fifi Lopez RN

## 2017-06-14 NOTE — CARE PLAN
Problem: Balance  Goal: STG-Within one week, patient will maintain dynamic standing  1) Individualized goal: Low fall risk on Rodriguez or DGI balance assessment  2) Interventions: PT Gait Training, PT Therapeutic Exercises, PT Neuro Re-Ed/Balance, PT Therapeutic Activity and PT Manual Therapy.   Outcome: NOT MET  Will assess today.    Problem: Mobility  Goal: STG-Within one week, patient will ambulate up/down flight of stairs  1) Individualized goal: SPV for 12 stairs with 1-2 railings.  2) Interventions: PT Gait Training, PT Therapeutic Exercises, PT Neuro Re-Ed/Balance, PT Therapeutic Activity and PT Manual Therapy.  Outcome: MET Date Met:  06/14/17  Goal: STG-Within one week, patient will ambulate community distances  1) Individualized goal: SPV indoors/outdoors x500 feet with cueing and no LOB.  2) Interventions: PT Gait Training, PT Therapeutic Exercises, PT Neuro Re-Ed/Balance, PT Therapeutic Activity and PT Manual Therapy.    Outcome: MET Date Met:  06/14/17    Problem: Mobility Transfers  Goal: STG-Within one week, patient will transfer bed to chair  1) Individualized goal: SPV with cueing and no LOB.  2) Interventions: PT Gait Training, PT Therapeutic Exercises, PT Neuro Re-Ed/Balance, PT Therapeutic Activity and PT Manual Therapy.  Outcome: MET Date Met:  06/14/17

## 2017-06-14 NOTE — REHAB-CM IDT TEAM NOTE
Case Management   DC Planning  DC destination/dispostion:  Return home w/ his parents where they live in [place], [place].      Referrals: PT to refer to [facility] due to leg discrepency.      DC Needs: Anticipate out pt therapy; follow up with PCP/Neurosurgeon     Barriers to discharge: None       Strengths:  Pleasant, cooperative, making gains with therapy; supportive family    Section completed by:  KENRICK Lugo, [facility]

## 2017-06-14 NOTE — CARE PLAN
Problem: Safety  Goal: Will remain free from falls  Outcome: PROGRESSING AS EXPECTED  Pt uses call light consistently and appropriately. Waits for assistance does not attempt self transfer this shift. Able to verbalize needs.    Problem: Bowel/Gastric:  Goal: Normal bowel function is maintained or improved  Outcome: PROGRESSING AS EXPECTED  Patient having regular bowel movements; last BM yesterday.  Denies s/s constipation; bowel meds available if needed.  Will continue to monitor. Refused stool softener this morning.

## 2017-06-14 NOTE — CARE PLAN
Problem: Bathing  Goal: STG-Within one week, patient will bathe  1) Individualized Goal: With Sup/SBA with or without AE  2) Interventions: OT Self Care/ADL, OT Cognitive Skill Dev, OT Manual Ther Technique, OT Neuro Re-Ed/Balance, OT Therapeutic Activity, OT Evaluation and OT Therapeutic Exercise  Outcome: MET Date Met:  06/14/17  Sup/SBA    Problem: Dressing  Goal: STG-Within one week, patient will dress LB  1) Individualized Goal: With or without AE (socks, shoes, pants) with Sup/SBA  2) Interventions: OT Self Care/ADL, OT Cognitive Skill Dev, OT Manual Ther Technique, OT Neuro Re-Ed/Balance, OT Therapeutic Activity, OT Evaluation and OT Therapeutic Exercise   Outcome: MET Date Met:  06/14/17  Mod Indep    Problem: Toileting  Goal: STG-Within one week, patient will complete toileting tasks  1) Individualized Goal: With or without AE and Sup/SBA  2) Interventions: OT Self Care/ADL, OT Cognitive Skill Dev, OT Manual Ther Technique, OT Neuro Re-Ed/Balance, OT Therapeutic Activity, OT Evaluation and OT Therapeutic Exercise  Outcome: MET Date Met:  06/14/17  Sup/SBA

## 2017-06-15 PROCEDURE — 92507 TX SP LANG VOICE COMM INDIV: CPT

## 2017-06-15 PROCEDURE — A9270 NON-COVERED ITEM OR SERVICE: HCPCS | Performed by: PHYSICAL MEDICINE & REHABILITATION

## 2017-06-15 PROCEDURE — 97532 HCHG COGNITIVE SKILL DEV. 15 MIN: CPT

## 2017-06-15 PROCEDURE — 97112 NEUROMUSCULAR REEDUCATION: CPT

## 2017-06-15 PROCEDURE — 97535 SELF CARE MNGMENT TRAINING: CPT

## 2017-06-15 PROCEDURE — 770010 HCHG ROOM/CARE - REHAB SEMI PRIVAT*

## 2017-06-15 PROCEDURE — 97530 THERAPEUTIC ACTIVITIES: CPT

## 2017-06-15 PROCEDURE — 99232 SBSQ HOSP IP/OBS MODERATE 35: CPT | Performed by: PHYSICAL MEDICINE & REHABILITATION

## 2017-06-15 PROCEDURE — 700102 HCHG RX REV CODE 250 W/ 637 OVERRIDE(OP): Performed by: PHYSICAL MEDICINE & REHABILITATION

## 2017-06-15 RX ADMIN — TRAZODONE HYDROCHLORIDE 50 MG: 50 TABLET ORAL at 22:31

## 2017-06-15 RX ADMIN — DIVALPROEX SODIUM 500 MG: 500 TABLET, DELAYED RELEASE ORAL at 20:35

## 2017-06-15 RX ADMIN — LEVETIRACETAM 1000 MG: 500 TABLET, FILM COATED ORAL at 08:10

## 2017-06-15 RX ADMIN — LEVETIRACETAM 1000 MG: 500 TABLET, FILM COATED ORAL at 20:35

## 2017-06-15 ASSESSMENT — GAIT ASSESSMENTS
DEVIATION: OTHER (COMMENT)
ASSISTIVE DEVICE: OTHER (COMMENTS)

## 2017-06-15 ASSESSMENT — PAIN SCALES - GENERAL: PAINLEVEL_OUTOF10: 0

## 2017-06-15 NOTE — DISCHARGE PLANNING
· Rehab Outpatient Therapy  For PT/SLP in [place].   [address] (Entrance in back)  [place] [address] [phone]   Referral made to above.     · Tc to Dr. Mendez Montez's office in [place]; not taking new patients.       · Tc to [facility] [phone] to schedule/establish  pt w/ new pcp.    [address], [place] [address]           Fax:  [phone]

## 2017-06-15 NOTE — DISCHARGE PLANNING
Follow-up with  Dr.David Larry, Neurosurgoen   On 6/22/2017 Thursday @ 1:00pm. [address] [place] [address]  [phone]    Tc to Dr. White's office  re: follow up appt; only able to leave message.

## 2017-06-15 NOTE — CARE PLAN
Problem: Safety  Goal: Will remain free from injury  Outcome: PROGRESSING AS EXPECTED  Pt. With good safety awareness noted, Mod.I in the room ambulating with steady gait. Pt's. Father at bedside. Pt. Assisted during shower before bedtime. Continue hourly rounding for safety, pt.closed the door.    Problem: Pain Management  Goal: Pain level will decrease to patient’s comfort goal  Outcome: PROGRESSING AS EXPECTED  Pt. Resting comfortably, denies pain. No complaints made. Continue monitor condition. Had snacks for comfort.

## 2017-06-15 NOTE — DISCHARGE PLANNING
CASE MANAGEMENT/ I met with pt and his dad, Don providing an update from team conference, plan of care, and projected dc date of Saturday 6/24 with recommendations for out patient PT/SLP.  They plans to return to [place] after dc.  Pt does not have a PCP.  Highly suggested/recommend he get established with one,  and informed them I would assist w/ finding one along with finding an out therapy therapy for PT/SLP.  Pt's dad indicates they will provide supervision for 2 weeks at time of dc.  Pt has follow up with Dr. Larry on 6/22 and pt's dad unsure if he needs follow up with Neurology Dr. White at [place].  Pt's father had questions about Kepra-advised them MD would be able to address.  He appears to be very resistant to medications.  Provided information on applying for [place] State Disability.

## 2017-06-15 NOTE — CARE PLAN
Problem: Safety  Goal: Will remain free from injury  Patient demonstrates good safety technique this shift.  Asks for assistance when needed and does not attempt self transfer.  Able to verbalize needs.  Will continue to monitor.    Problem: Pain Management  Goal: Pain level will decrease to patient’s comfort goal  Patient able to perform regular activities this shift.  Denies this shift.  Pain management includes PRN pain meds as well as non-pharmacological measures such as emotional support, rest, and repositioning.  Will continue to monitor.

## 2017-06-16 PROCEDURE — 770010 HCHG ROOM/CARE - REHAB SEMI PRIVAT*

## 2017-06-16 PROCEDURE — 97535 SELF CARE MNGMENT TRAINING: CPT

## 2017-06-16 PROCEDURE — 97112 NEUROMUSCULAR REEDUCATION: CPT

## 2017-06-16 PROCEDURE — 97530 THERAPEUTIC ACTIVITIES: CPT

## 2017-06-16 PROCEDURE — 97532 HCHG COGNITIVE SKILL DEV. 15 MIN: CPT

## 2017-06-16 PROCEDURE — A9270 NON-COVERED ITEM OR SERVICE: HCPCS | Performed by: PHYSICAL MEDICINE & REHABILITATION

## 2017-06-16 PROCEDURE — 700102 HCHG RX REV CODE 250 W/ 637 OVERRIDE(OP): Performed by: PHYSICAL MEDICINE & REHABILITATION

## 2017-06-16 PROCEDURE — 99232 SBSQ HOSP IP/OBS MODERATE 35: CPT | Performed by: PHYSICAL MEDICINE & REHABILITATION

## 2017-06-16 RX ADMIN — LEVETIRACETAM 1000 MG: 500 TABLET, FILM COATED ORAL at 08:40

## 2017-06-16 RX ADMIN — Medication 1 TABLET: at 20:03

## 2017-06-16 RX ADMIN — DIVALPROEX SODIUM 500 MG: 500 TABLET, DELAYED RELEASE ORAL at 20:03

## 2017-06-16 RX ADMIN — LEVETIRACETAM 1000 MG: 500 TABLET, FILM COATED ORAL at 20:03

## 2017-06-16 ASSESSMENT — PAIN SCALES - GENERAL
PAINLEVEL_OUTOF10: 0
PAINLEVEL_OUTOF10: 0

## 2017-06-16 NOTE — CARE PLAN
Problem: Safety  Goal: Will remain free from injury  Outcome: PROGRESSING AS EXPECTED  Patient demonstrates good safety technique this shift.  Asks for assistance when needed but is MOD I in room.  Able to verbalize needs.

## 2017-06-16 NOTE — CARE PLAN
Problem: Safety  Goal: Will remain free from injury  Outcome: PROGRESSING AS EXPECTED  Pt in bed resting, dad in the room with patient, SR up x 2, bed on low position, non skid socks/shoes when OOB, hourly rounding maintained.    Problem: Pain Management  Goal: Pain level will decrease to patient’s comfort goal  Outcome: PROGRESSING AS EXPECTED  Pt. resting comfortably, denies pain at time of assessment. Requested pain medication around 2200, will continue to monitor.

## 2017-06-17 LAB
ALBUMIN SERPL BCP-MCNC: 4.1 G/DL (ref 3.2–4.9)
ALBUMIN/GLOB SERPL: 1.4 G/DL
ALP SERPL-CCNC: 68 U/L (ref 30–99)
ALT SERPL-CCNC: 9 U/L (ref 2–50)
ANION GAP SERPL CALC-SCNC: 8 MMOL/L (ref 0–11.9)
AST SERPL-CCNC: 11 U/L (ref 12–45)
BASOPHILS # BLD AUTO: 0.7 % (ref 0–1.8)
BASOPHILS # BLD: 0.03 K/UL (ref 0–0.12)
BILIRUB CONJ SERPL-MCNC: <0.1 MG/DL (ref 0.1–0.5)
BILIRUB INDIRECT SERPL-MCNC: ABNORMAL MG/DL (ref 0–1)
BILIRUB SERPL-MCNC: 0.3 MG/DL (ref 0.1–1.5)
BUN SERPL-MCNC: 10 MG/DL (ref 8–22)
CALCIUM SERPL-MCNC: 9.3 MG/DL (ref 8.5–10.5)
CHLORIDE SERPL-SCNC: 105 MMOL/L (ref 96–112)
CO2 SERPL-SCNC: 26 MMOL/L (ref 20–33)
CREAT SERPL-MCNC: 0.79 MG/DL (ref 0.5–1.4)
EOSINOPHIL # BLD AUTO: 0.03 K/UL (ref 0–0.51)
EOSINOPHIL NFR BLD: 0.7 % (ref 0–6.9)
ERYTHROCYTE [DISTWIDTH] IN BLOOD BY AUTOMATED COUNT: 47.6 FL (ref 35.9–50)
GFR SERPL CREATININE-BSD FRML MDRD: >60 ML/MIN/1.73 M 2
GLOBULIN SER CALC-MCNC: 2.9 G/DL (ref 1.9–3.5)
GLUCOSE SERPL-MCNC: 145 MG/DL (ref 65–99)
HCT VFR BLD AUTO: 37.7 % (ref 42–52)
HGB BLD-MCNC: 12.1 G/DL (ref 14–18)
IMM GRANULOCYTES # BLD AUTO: 0.02 K/UL (ref 0–0.11)
IMM GRANULOCYTES NFR BLD AUTO: 0.4 % (ref 0–0.9)
LYMPHOCYTES # BLD AUTO: 1.35 K/UL (ref 1–4.8)
LYMPHOCYTES NFR BLD: 29.7 % (ref 22–41)
MCH RBC QN AUTO: 29.2 PG (ref 27–33)
MCHC RBC AUTO-ENTMCNC: 32.1 G/DL (ref 33.7–35.3)
MCV RBC AUTO: 90.8 FL (ref 81.4–97.8)
MONOCYTES # BLD AUTO: 0.42 K/UL (ref 0–0.85)
MONOCYTES NFR BLD AUTO: 9.2 % (ref 0–13.4)
NEUTROPHILS # BLD AUTO: 2.7 K/UL (ref 1.82–7.42)
NEUTROPHILS NFR BLD: 59.3 % (ref 44–72)
NRBC # BLD AUTO: 0 K/UL
NRBC BLD AUTO-RTO: 0 /100 WBC
PLATELET # BLD AUTO: 248 K/UL (ref 164–446)
PMV BLD AUTO: 11.5 FL (ref 9–12.9)
POTASSIUM SERPL-SCNC: 4.2 MMOL/L (ref 3.6–5.5)
PROT SERPL-MCNC: 7 G/DL (ref 6–8.2)
RBC # BLD AUTO: 4.15 M/UL (ref 4.7–6.1)
SODIUM SERPL-SCNC: 139 MMOL/L (ref 135–145)
WBC # BLD AUTO: 4.6 K/UL (ref 4.8–10.8)

## 2017-06-17 PROCEDURE — A9270 NON-COVERED ITEM OR SERVICE: HCPCS | Performed by: PHYSICAL MEDICINE & REHABILITATION

## 2017-06-17 PROCEDURE — 770010 HCHG ROOM/CARE - REHAB SEMI PRIVAT*

## 2017-06-17 PROCEDURE — 700102 HCHG RX REV CODE 250 W/ 637 OVERRIDE(OP): Performed by: PHYSICAL MEDICINE & REHABILITATION

## 2017-06-17 PROCEDURE — 80053 COMPREHEN METABOLIC PANEL: CPT

## 2017-06-17 PROCEDURE — 85025 COMPLETE CBC W/AUTO DIFF WBC: CPT

## 2017-06-17 PROCEDURE — 82248 BILIRUBIN DIRECT: CPT

## 2017-06-17 RX ADMIN — LEVETIRACETAM 1000 MG: 500 TABLET, FILM COATED ORAL at 08:31

## 2017-06-17 RX ADMIN — DIVALPROEX SODIUM 500 MG: 500 TABLET, DELAYED RELEASE ORAL at 20:33

## 2017-06-17 RX ADMIN — Medication 1 TABLET: at 20:34

## 2017-06-17 RX ADMIN — LEVETIRACETAM 1000 MG: 500 TABLET, FILM COATED ORAL at 20:33

## 2017-06-17 ASSESSMENT — PAIN SCALES - GENERAL: PAINLEVEL_OUTOF10: 0

## 2017-06-17 NOTE — CARE PLAN
Problem: Safety  Goal: Will remain free from falls  Outcome: MET Date Met:  06/17/17  Safety precautions are in place to avoid accidental injury including call light and personal possessions within easy reach, hourly rounding and assessing need for toileting and pain. Patient is modified independent with no device in room and family is cleared to ambulate with patient with gait belt on unit. Patient is in agreement for safety measures and verbalizes understanding of indication. Will continue to monitor for accidental injury and prevent by continuing safety precautions.     Problem: Bowel/Gastric:  Goal: Normal bowel function is maintained or improved  Outcome: MET Date Met:  06/17/17  Patient is continent of bowel, using toilet for elimination. Patient takes scheduled bowel medications as ordered, and has PRN medications for constipation. Patient denies gastrointestinal pain and discomfort at this time, bowel tones are normoactive in all four quadrants. Will continue to monitor and modify goals as needed.     Problem: Knowledge Deficit  Goal: Knowledge of disease process/condition, treatment plan, diagnostic tests, and medications will improve  Outcome: PROGRESSING AS EXPECTED  Patient was educated regarding medications. Education consisted of indication, and effects both therapeutic and adverse. Patient verbalizes understanding of this education. Will reinforce at every medication pass and address patient concerns. Will continue to monitor.

## 2017-06-17 NOTE — PROGRESS NOTES
"Received report from NOC RN and assumed patient care at this time. During first rounds, patient awake in bed, patient and mother state that he has a therapeutic pass for \"an adventure\" today, will confirm with therapy and physician. No complaints at this time. Will continue to monitor.  "

## 2017-06-18 PROCEDURE — A9270 NON-COVERED ITEM OR SERVICE: HCPCS | Performed by: PHYSICAL MEDICINE & REHABILITATION

## 2017-06-18 PROCEDURE — 700102 HCHG RX REV CODE 250 W/ 637 OVERRIDE(OP): Performed by: PHYSICAL MEDICINE & REHABILITATION

## 2017-06-18 PROCEDURE — 97532 HCHG COGNITIVE SKILL DEV. 15 MIN: CPT

## 2017-06-18 PROCEDURE — 700112 HCHG RX REV CODE 229: Performed by: PHYSICAL MEDICINE & REHABILITATION

## 2017-06-18 PROCEDURE — 770010 HCHG ROOM/CARE - REHAB SEMI PRIVAT*

## 2017-06-18 RX ADMIN — DIVALPROEX SODIUM 500 MG: 500 TABLET, DELAYED RELEASE ORAL at 20:00

## 2017-06-18 RX ADMIN — LEVETIRACETAM 1000 MG: 500 TABLET, FILM COATED ORAL at 09:19

## 2017-06-18 RX ADMIN — Medication 1 TABLET: at 20:00

## 2017-06-18 RX ADMIN — LEVETIRACETAM 1000 MG: 500 TABLET, FILM COATED ORAL at 20:00

## 2017-06-18 ASSESSMENT — PAIN SCALES - GENERAL
PAINLEVEL_OUTOF10: 0

## 2017-06-18 NOTE — PROGRESS NOTES
Received shift report and assumed care of patient.  Patient sleeping in bed with calm, unlabored respirations; call light within reach.  Sister sleeping in other bed in pt room. Will continue to monitor with hourly and PRN rounding.

## 2017-06-18 NOTE — CARE PLAN
Problem: Safety  Goal: Will remain free from injury  Outcome: PROGRESSING AS EXPECTED  Pt. Is mod.I in the room, ambulating ad.robert with steady gait. Independent with adl's except shower need standby assist for that. Hourly rounding continue. Sister at bedside all night.    Problem: Infection  Goal: Will remain free from infection  Outcome: PROGRESSING AS EXPECTED  Pt. Head incision open to air healing well no s/s of infection noted.    Problem: Pain Management  Goal: Pain level will decrease to patient’s comfort goal  Outcome: PROGRESSING AS EXPECTED  Pt. Resting comfortably, on his computer. Denies pain. Had some comfort food. Continue monitor condition.

## 2017-06-19 PROCEDURE — 700102 HCHG RX REV CODE 250 W/ 637 OVERRIDE(OP): Performed by: PHYSICAL MEDICINE & REHABILITATION

## 2017-06-19 PROCEDURE — 770010 HCHG ROOM/CARE - REHAB SEMI PRIVAT*

## 2017-06-19 PROCEDURE — 97532 HCHG COGNITIVE SKILL DEV. 15 MIN: CPT

## 2017-06-19 PROCEDURE — 97116 GAIT TRAINING THERAPY: CPT

## 2017-06-19 PROCEDURE — 92507 TX SP LANG VOICE COMM INDIV: CPT

## 2017-06-19 PROCEDURE — 97110 THERAPEUTIC EXERCISES: CPT

## 2017-06-19 PROCEDURE — A9270 NON-COVERED ITEM OR SERVICE: HCPCS | Performed by: PHYSICAL MEDICINE & REHABILITATION

## 2017-06-19 RX ADMIN — LEVETIRACETAM 1000 MG: 500 TABLET, FILM COATED ORAL at 20:10

## 2017-06-19 RX ADMIN — LEVETIRACETAM 1000 MG: 500 TABLET, FILM COATED ORAL at 08:36

## 2017-06-19 RX ADMIN — DIVALPROEX SODIUM 500 MG: 500 TABLET, DELAYED RELEASE ORAL at 20:10

## 2017-06-19 ASSESSMENT — PAIN SCALES - GENERAL
PAINLEVEL_OUTOF10: 0
PAINLEVEL_OUTOF10: 0

## 2017-06-19 NOTE — CARE PLAN
Problem: Bowel/Gastric:  Goal: Will not experience complications related to bowel motility  Outcome: PROGRESSING AS EXPECTED  Abdomen is soft, flat, non tender with normoactive bowel sounds x 4 quads, pt had large, soft continent BM today per Mom, pt refused colace this am, monitor.    Problem: Mobility  Goal: Risk for activity intolerance will decrease  Outcome: PROGRESSING AS EXPECTED  Pt is Shari in his room and may walk in unit with SBA and supervision of family and staff, pt has been resting in his bed most of today, has been out of bed for therapy today, mother, sister and grandmother here today, pt is pleasant and cooperative, will continue to monitor.

## 2017-06-19 NOTE — CARE PLAN
Problem: Safety  Goal: Will remain free from injury  Outcome: PROGRESSING AS EXPECTED  Patient is stable when walking and has been made MOD I on the unit without an assistive devise.

## 2017-06-19 NOTE — DISCHARGE PLANNING
Insurance is not able to authorize additional days w/o peer to peer.  Insurance suggesting pt is appropriate for dc since he is mod indep w/ mobility and ADL's and can receive out pt speech therapy.  Discussed w/ Dr. Fernandes.

## 2017-06-19 NOTE — CARE PLAN
Problem: Safety  Goal: Will remain free from injury  Outcome: PROGRESSING AS EXPECTED  Pt. Is mod. I except during shower need standby assist. Pt. Ambulating well doing his own adl's without supervision. Father at bedside all night, continue hourly rounding for safety.    Problem: Infection  Goal: Will remain free from infection  Outcome: PROGRESSING AS EXPECTED  Pt. Is afebrile, head incision healing well open to air no s/s of infection noted.    Problem: Pain Management  Goal: Pain level will decrease to patient’s comfort goal  Outcome: PROGRESSING AS EXPECTED  Pt. Resting and sleeping comfortably, no complain of pain. Comfort food at bedside. Continue monitor condition.

## 2017-06-20 PROCEDURE — 700102 HCHG RX REV CODE 250 W/ 637 OVERRIDE(OP): Performed by: PHYSICAL MEDICINE & REHABILITATION

## 2017-06-20 PROCEDURE — A9270 NON-COVERED ITEM OR SERVICE: HCPCS | Performed by: PHYSICAL MEDICINE & REHABILITATION

## 2017-06-20 PROCEDURE — 97112 NEUROMUSCULAR REEDUCATION: CPT

## 2017-06-20 PROCEDURE — 99232 SBSQ HOSP IP/OBS MODERATE 35: CPT | Performed by: PHYSICAL MEDICINE & REHABILITATION

## 2017-06-20 PROCEDURE — 97532 HCHG COGNITIVE SKILL DEV. 15 MIN: CPT

## 2017-06-20 PROCEDURE — 97535 SELF CARE MNGMENT TRAINING: CPT

## 2017-06-20 PROCEDURE — 97530 THERAPEUTIC ACTIVITIES: CPT

## 2017-06-20 PROCEDURE — 92507 TX SP LANG VOICE COMM INDIV: CPT

## 2017-06-20 PROCEDURE — 770010 HCHG ROOM/CARE - REHAB SEMI PRIVAT*

## 2017-06-20 RX ADMIN — LEVETIRACETAM 1000 MG: 500 TABLET, FILM COATED ORAL at 20:01

## 2017-06-20 RX ADMIN — DIVALPROEX SODIUM 500 MG: 500 TABLET, DELAYED RELEASE ORAL at 20:01

## 2017-06-20 RX ADMIN — LEVETIRACETAM 1000 MG: 500 TABLET, FILM COATED ORAL at 09:02

## 2017-06-20 ASSESSMENT — PAIN SCALES - GENERAL
PAINLEVEL_OUTOF10: 0

## 2017-06-20 ASSESSMENT — GAIT ASSESSMENTS
DEVIATION: DECREASED HEEL STRIKE
GAIT LEVEL OF ASSIST: INDEPENDENT

## 2017-06-20 NOTE — DISCHARGE PLANNING
Case mangement  Collaberation with Dr Fernandes and updates provided from therapy on pt's status/progress.  Plan to dc on Wed 6/21 with out pt PT/OT.  No dme needed.  Appt made w/ new pcp for patient.

## 2017-06-20 NOTE — REHAB-PHARMACY IDT TEAM NOTE
Pharmacy  Pharmacy  Antibiotics/Antifungals/Antivirals:  Medication:      Active Orders     None        Route:         n/a  Stop Date:  n/a  Reason:   Antihypertensives/Cardiac:  Medication:    Orders     None        Patient Vitals for the past 24 hrs:   BP Pulse   06/20/17 0657 103/67 mmHg 84   06/19/17 1900 114/74 mmHg 77   06/19/17 1347 (!) 95/65 mmHg (!) 109       Anticoagulation:  Medication:  n/a  INR:      Other key medications: Divalproex, Levetiracetam, Trazodone.   A review of the medication list reveals no issues at this time.  Section completed by:  Leatha Arce [facility]

## 2017-06-20 NOTE — CARE PLAN
Problem: IADL’s  Goal: STG-Within one week, patient will access kitchen area  1) Individualized Goal: With Sup/SBA to access high/low cabinets, refrigerator and drawers  2) Interventions: OT Self Care/ADL, OT Cognitive Skill Dev, OT Neuro Re-Ed/Balance, OT Therapeutic Activity, OT Evaluation and OT Therapeutic Exercise  Outcome: MET Date Met:  06/20/17  Sup/SBA  Goal: STG-Within one week, patient will utilize washer and dryer  1) Individualized Goal: Sup/SBA for set up, completion of task and clean up.  2) Interventions: OT Self Care/ADL, OT Cognitive Skill Dev, OT Neuro Re-Ed/Balance, OT Therapeutic Activity, OT Evaluation and OT Therapeutic Exercise  Outcome: NOT MET  Min

## 2017-06-20 NOTE — REHAB-SLP IDT TEAM NOTE
Speech Therapy  Cognitive Linquistic Functions  Comprehension Initial:  5 - Stand-by Prompting/Supervision or Set-up  Comprehension Current:  5 - Stand-by Prompting/Supervision or Set-up   Comprehension Description:  Verbal cues  Expression Initial:  4 - Minimal Assistance  Expression Current:  4 - Minimal Assistance   Expression Description:  Verbal cueing  Social Interaction Initial:  6 - Modified Independent  Social Interaction Current:  6 - Modified Independent   Social Interaction Description:  Verbal cues  Problem Solving Initial:  3 - Moderate Assistance  Problem Solving Current:  4 - Minimal Assistance   Problem Solving Description:  Verbal cueing  Memory Initial:  2 - Max Assistance  Memory Current:  4 - Minimal Assistance   Memory Description:  Verbal cueing, Therapy schedule  Executive Functioning / Organization Initial:  Minimal (4)  Executive Functioning / Organization Current:  Supervision (5)   Executive Functioning Desciption: processing speed, reasoning skills and attention   Swallowing  Swallowing Status: Regular diet and thin liquids - not seen for dysphagia management    Orders Placed This Encounter   Procedures   • DIET ORDER     Standing Status: Standing      Number of Occurrences: 1      Standing Expiration Date:      Order Specific Question:  Diet:     Answer:  Regular [1]     Order Specific Question:  Consistency/Fluid modifications:     Answer:  Thin Liquids [3]     Behavior Modification Plan  Allow for rest time, Redirect to task/topic, Provide reasonable choices, Decrease the chance of failure by offering activities that are within the patient's abilities, Analyze tasks (break down into smaller steps) and Reinforce participation in desired tasks  Assistive Technology  Low tech: Calendar, planner, schedule, alarms/timers, pill organizer, post-it notes, lists  Family Training/Education:  Ongoing with family.   DC Recommendations:  Pt to d/c home with outpatient therapy on Saturday with  family.   Strengths:  Able to follow instructions, Independent PLOF, Motivated for self care and independence, Pleasant and cooperative, Supportive family and Willingly participates in therapeutic activities  Barriers:  Aphasia expressive, Poor insight/denial of deficits and Other: mild cognitive deficits   # of short term goals set=4  # of short term goals met=2        Speech Therapy Problems           Problem: Expression STGs     Dates: Start: 06/09/17       Goal: STG-Within one week, patient will     Dates: Start: 06/13/17      Description: 1) Individualized goal:  Describe at least three semantic features when presented with a picture or object with 80% accuracy provided minimal assistance.     2) Interventions:  SLP Speech Language Treatment        Note: Goal Note filed on 06/20/17 0739 by Isaura Moran MS,CCC-SLP    Goal: STG-Within one week, patient will  Outcome: NOT MET  Goal not met cont to require min-mod a.             Problem: Problem Solving STGs     Dates: Start: 06/13/17       Goal: STG-Within one week, patient will     Dates: Start: 06/13/17      Description: 1) Individualized goal:  Complete alternating and divided attention tasks with 80% accuracy provided minimal assistance.     2) Interventions:  SLP Self Care / ADL Training  and SLP Cognitive Skill Development        Note: Goal Note filed on 06/20/17 0739 by Isaura Moran MS,CCC-SLP    Goal: STG-Within one week, patient will  Outcome: NOT MET  Goal not met min-mod a required             Problem: Speech/Swallowing LTGs     Dates: Start: 06/09/17       Goal: LTG-By discharge, patient will     Dates: Start: 06/09/17      Description: 1) Individualized goal:  Express wants and needs with SPV at time of discharge with 80% accuracy.     2) Interventions:  SLP Speech Language Treatment            Goal: LTG-By discharge, patient will     Dates: Start: 06/09/17      Description: 1) Individualized goal:  Recall information and complete functional  problem solving tasks with 80% accuracy provided SPV    2) Interventions:  SLP Speech Language Treatment                   Section completed by:  Isaura Moran MS,CCC-SLP

## 2017-06-20 NOTE — REHAB-CM IDT TEAM NOTE
Case Management   DC Planning  DC destination/dispostion:  Home to [place], [place] where he will be staying with his parents at their home.     Referrals:  Made for Out Patient Speech Therapy in [place]; PCP in [place] also.     DC Needs: Out patient  SLP    Barriers to discharge:   Aphasic; memory deficits; needs supervision when crossing streets    Strengths: Supportive parents, prior level of function, making gains w/ therapies; mod indep w/ no device.      Section completed by:  KENRICK Lugo, CCM

## 2017-06-20 NOTE — CARE PLAN
Problem: Bathing  Goal: STG-Within one week, patient will bathe  1) Individualized Goal: Mod Indep (set up, completion of task, clean up).  2) Interventions: OT Self Care/ADL, OT Cognitive Skill Dev, OT Neuro Re-Ed/Balance, OT Therapeutic Activity, OT Evaluation and OT Therapeutic Exercise  Outcome: NOT MET  Currently Sup/SBA

## 2017-06-20 NOTE — CARE PLAN
Problem: Comprehension STGs  Goal: STG-Within one week, patient will  1) Individualized goal: Follow 2 step directions with 80% accuracy provided minimal assistance.   2) Interventions: SLP Speech Language Treatment, SLP Self Care / ADL Training and SLP Cognitive Skill Development    Outcome: MET Date Met:  06/20/17  Goal met     Problem: Expression STGs  Goal: STG-Within one week, patient will  1) Individualized goal: Describe at least three semantic features when presented with a picture or object with 80% accuracy provided minimal assistance.   2) Interventions: SLP Speech Language Treatment    Outcome: NOT MET  Goal not met cont to require min-mod a.     Problem: Memory STGs  Goal: STG-Within one week, patient will  1) Individualized goal: Recall information related to hospitalization and daily tasks with 80% accuracy provided minimal assistance.   2) Interventions: SLP Self Care / ADL Training and SLP Cognitive Skill Development    Outcome: MET Date Met:  06/20/17  Goal met    Problem: Problem Solving STGs  Goal: STG-Within one week, patient will  1) Individualized goal: Complete alternating and divided attention tasks with 80% accuracy provided minimal assistance.   2) Interventions: SLP Self Care / ADL Training and SLP Cognitive Skill Development    Outcome: NOT MET  Goal not met min-mod a required

## 2017-06-20 NOTE — CARE PLAN
Problem: Safety  Goal: Will remain free from injury  Outcome: PROGRESSING AS EXPECTED  Pt. Is mod.I in the room/unit. Father at bedside all night. Hourly rounding continue for safety.    Problem: Pain Management  Goal: Pain level will decrease to patient’s comfort goal  Outcome: PROGRESSING AS EXPECTED  Pt. Resting comfortably, sleeping well. Denies pain at this time, no complaints made. Continue monitor condition.

## 2017-06-20 NOTE — DISCHARGE PLANNING
Case Management Discharge Instructions for Meir Byrd  Discharge Date:  Wednesday 6/21/2017.    Discharge Location Home with family along with outpatient therapy for speech therapy and physical therapy.       [facility]   [address] [place] [address]  Please call to schedule out patient speech and physical therapy .    Medical Equipment Ordered None needed.      Prescription Electronically   sent to:  [facility] [place] [address]        Follow-up With Why Contact Info   Dr. Francisco Larry,   Neurosurgeon 6/22/2017 Thursday @ 1:00pm.  [address] [address]  [phone]     Dr. James Peguero,   Primary Care 7/10/2017 Monday @ 9:50am. Records will be sent to [facility] primary care's office.  [address] [place] [address] [phone]      Dr. Aaron White,   Neurologist   Please choose either Neurologist or Epileptologist for follow up as listed below.    [address]  [place] [address]  [phone]   Dr. Brain Valdes,  Epileptologist   [address] [facility] [place] [address] [phone]   Dr. Nguyen   Epileptologist   [facility] [facility] [place] [address] [phone]

## 2017-06-20 NOTE — DISCHARGE PLANNING
Referral for outpatient therapy sent to [facility].  Per Wendy, referral received and accepted.

## 2017-06-20 NOTE — REHAB-OT IDT TEAM NOTE
Occupational Therapy  Activities of Daily Living  Eating Initial:  6 - Modified Independent  Eating Current:  7 - Independent   Eating Description:   (Mod Indep w/ container management, scoop with spoon from bowl, hand to mouth to self feed w/ no spillage)  Grooming Initial:  4 - Minimal Assistance  Grooming Current:  7 - Independent   Grooming Description:   (standing at sink for groom/hygiene routine)  Bathing Initial:  4 - Minimal Assistance  Bathing Current:  5 - Standby Prompting/Supervision or Set-up   Bathing Description:  Adaptive equipment, Grab bar, Tub bench, Hand held shower  Upper Body Dressing Initial:  5 - Standby Prompting/Supervision or Set-up  Upper Body Dressing Current:  6 - Modified Independent   Upper Body Dressing Description:   (Retrieved clothing from closet, Mod Indep to don/doff pull over shirt)  Lower Body Dressing Initial:  4 - Minimal Assistance  Lower Body Dressing Current:  6 - Modified Independent   Lower Body Dressing Description:  6 - Modified Independent  Toileting Initial:  4 - Minimal Assistance  Toileting Current:  7 - Independent   Toileting Description:  Increased time  Toilet Transfer Initial:  5 - Standby Prompting/Supervision or Set-up  Toilet Transfer Current:  7 - Independent   Toilet Transfer Description:  7 - Independent  Tub / Shower Transfer Initial:  5 - Standby Prompting/Supervision or Set-up  Tub / Shower Transfer Current:  6 - Modified Independent   Tub / Shower Transfer Description:   (SPT to/from shower chair in walk in shower stall w/out device)  IADL:  TBD   Family Training/Education:  TBD   DME/DC Recommendations:  TBD     Strengths:  Independent PLOF, Making steady progress towards goals, Motivated for self care and independence, Pleasant and cooperative, Supportive family and Willingly participates in therapeutic activities  Barriers:  Generalized weakness     # of short term goals set= 6    # of short term goals met= 4          Occupational Therapy Goals            Problem: Bathing     Dates: Start: 06/09/17       Goal: STG-Within one week, patient will bathe     Dates: Start: 06/14/17      Description: 1) Individualized Goal:  Mod Indep (set up, completion of task, clean up).    2) Interventions:  OT Self Care/ADL, OT Cognitive Skill Dev, OT Neuro Re-Ed/Balance, OT Therapeutic Activity, OT Evaluation and OT Therapeutic Exercise        Note: Goal Note filed on 06/20/17 1353 by Frederick Santos MS,OTR/L    Goal: STG-Within one week, patient will bathe  Outcome: NOT MET  Currently Sup/SBA            Problem: IADL's     Dates: Start: 06/14/17       Goal: STG-Within one week, patient will utilize washer and dryer     Dates: Start: 06/14/17      Description: 1) Individualized Goal:  Sup/SBA for set up, completion of task and clean up.    2) Interventions:  OT Self Care/ADL, OT Cognitive Skill Dev, OT Neuro Re-Ed/Balance, OT Therapeutic Activity, OT Evaluation and OT Therapeutic Exercise        Note: Goal Note filed on 06/20/17 1357 by Frederick Santos MS,OTR/L    Goal: STG-Within one week, patient will utilize washer and dryer  Outcome: NOT MET  Min             Problem: OT Long Term Goals     Dates: Start: 06/09/17       Goal: LTG-By discharge, patient will complete basic self care tasks     Dates: Start: 06/09/17      Description: 1) Individualized Goal:  With Mod Indep for bathroom/hygiene ADL's    2) Interventions:  OT Self Care/ADL, OT Cognitive Skill Dev, OT Manual Ther Technique, OT Neuro Re-Ed/Balance, OT Therapeutic Activity, OT Evaluation and OT Therapeutic Exercise            Goal: LTG-By discharge, patient will perform bathroom transfers     Dates: Start: 06/09/17      Description: 1) Individualized Goal: with or without AE and Mod Indep    2) Interventions:  OT Self Care/ADL, OT Cognitive Skill Dev, OT Manual Ther Technique, OT Neuro Re-Ed/Balance, OT Therapeutic Activity, OT Evaluation and OT Therapeutic Exercise                  Section completed by:  Frederick Santos  MS,OTR/L

## 2017-06-20 NOTE — DISCHARGE PLANNING
"Tc to Dr. Aaron White to confirm when he wants follow up for pt.  Office indicates they do not have \"any information on patient\".  Advised them pt was seen during pt's stay @ [facility].  Will advise pt/father that they will need to contact office to schedule follow up.   "

## 2017-06-21 VITALS
TEMPERATURE: 97.9 F | BODY MASS INDEX: 19.56 KG/M2 | OXYGEN SATURATION: 96 % | SYSTOLIC BLOOD PRESSURE: 111 MMHG | WEIGHT: 121.69 LBS | DIASTOLIC BLOOD PRESSURE: 76 MMHG | HEIGHT: 66 IN | RESPIRATION RATE: 18 BRPM | HEART RATE: 91 BPM

## 2017-06-21 PROCEDURE — 99239 HOSP IP/OBS DSCHRG MGMT >30: CPT | Performed by: PHYSICAL MEDICINE & REHABILITATION

## 2017-06-21 PROCEDURE — 97535 SELF CARE MNGMENT TRAINING: CPT

## 2017-06-21 PROCEDURE — 97530 THERAPEUTIC ACTIVITIES: CPT

## 2017-06-21 PROCEDURE — 97116 GAIT TRAINING THERAPY: CPT

## 2017-06-21 PROCEDURE — 700102 HCHG RX REV CODE 250 W/ 637 OVERRIDE(OP): Performed by: PHYSICAL MEDICINE & REHABILITATION

## 2017-06-21 PROCEDURE — A9270 NON-COVERED ITEM OR SERVICE: HCPCS | Performed by: PHYSICAL MEDICINE & REHABILITATION

## 2017-06-21 RX ORDER — LEVETIRACETAM 1000 MG/1
1000 TABLET ORAL EVERY 12 HOURS
Qty: 60 TAB | Refills: 0 | Status: SHIPPED | OUTPATIENT
Start: 2017-06-21

## 2017-06-21 RX ORDER — DIVALPROEX SODIUM 500 MG/1
500 TABLET, DELAYED RELEASE ORAL
Qty: 90 TAB | Refills: 0 | Status: SHIPPED | OUTPATIENT
Start: 2017-06-21

## 2017-06-21 RX ADMIN — LEVETIRACETAM 1000 MG: 500 TABLET, FILM COATED ORAL at 08:43

## 2017-06-21 ASSESSMENT — ACTIVITIES OF DAILY LIVING (ADL)
TOILETING_LEVEL_OF_ASSIST: ABLE TO COMPLETE TOILETING WITHOUT ASSIST
SHOWER_TRANSFER_LEVEL_OF_ASSIST: ABLE TO COMPLETE SHOWER TRANSFER WITHOUT ASSIST
TOILET_TRANSFER_LEVEL_OF_ASSIST: ABLE TO COMPLETE TOILET TRANSFER WITHOUT ASSIST

## 2017-06-21 ASSESSMENT — PAIN SCALES - GENERAL
PAINLEVEL_OUTOF10: 0
PAINLEVEL_OUTOF10: 0

## 2017-06-21 NOTE — CARE PLAN
Problem: Infection  Goal: Will remain free from infection  Outcome: PROGRESSING AS EXPECTED  Scalp incision is open to air, well approximated, with few scabs, no redness or drainage present,pt has area of swelling on his left forehead that is firm, non tender, photo taken of scalp incision and placed in pt chart.  D/c instructions will be given regarding S & S of infections.    Problem: Knowledge Deficit  Goal: Knowledge of the prescribed therapeutic regimen will improve  Outcome: PROGRESSING AS EXPECTED  Pt and father ready to be discharged to family home in the [place] area today, pt has f/u appt with Dr Larry 6/22/17 and then they will drive home to [place], will provide education and discharge instructions to pt and father.

## 2017-06-21 NOTE — CARE PLAN
Problem: Speech/Swallowing LTGs  Goal: LTG-By discharge, patient will  1) Individualized goal: Express wants and needs with SPV at time of discharge with 80% accuracy.   2) Interventions: SLP Speech Language Treatment    Outcome: DISCHARGED-GOAL NOT MET Date Met:  06/21/17  Pt requires min a to SPV to express basic wants and needs at time of d/c.   Goal: LTG-By discharge, patient will  1) Individualized goal: Recall information and complete functional problem solving tasks with 80% accuracy provided SPV  2) Interventions: SLP Speech Language Treatment    Outcome: MET Date Met:  06/21/17  Goal met    Problem: Expression STGs  Goal: STG-Within one week, patient will  1) Individualized goal: Describe at least three semantic features when presented with a picture or object with 80% accuracy provided minimal assistance.   2) Interventions: SLP Speech Language Treatment    Outcome: MET Date Met:  06/21/17  Goal met    Problem: Problem Solving STGs  Goal: STG-Within one week, patient will  1) Individualized goal: Complete alternating and divided attention tasks with 80% accuracy provided minimal assistance.   2) Interventions: SLP Self Care / ADL Training and SLP Cognitive Skill Development    Outcome: DISCHARGED-GOAL NOT MET Date Met:  06/21/17  Min-mod a for attention tasks.

## 2017-06-21 NOTE — CARE PLAN
Problem: Bowel/Gastric:  Goal: Will not experience complications related to bowel motility  Outcome: PROGRESSING AS EXPECTED  Last BM 6/20/17 at 0600 per pt father, abdomen is soft, flat, non tender with normoactive bowel sounds x 4 quads, pt refuses bowel meds, will continue to monitor.    Problem: Mobility  Goal: Risk for activity intolerance will decrease  Outcome: PROGRESSING AS EXPECTED  Pt has been Shari in room and unit, father and other family members here today, denies pain, nausea, dizziness, blurred vision or nausea,  pt participating in therapies, calm, cooperative will continue to monitor.

## 2017-06-21 NOTE — CARE PLAN
Problem: Bathing  Goal: STG-Within one week, patient will bathe  1) Individualized Goal: Mod Indep (set up, completion of task, clean up).  2) Interventions: OT Self Care/ADL, OT Cognitive Skill Dev, OT Neuro Re-Ed/Balance, OT Therapeutic Activity, OT Evaluation and OT Therapeutic Exercise  Outcome: MET Date Met:  06/21/17  Mod Indep    Problem: OT Long Term Goals  Goal: LTG-By discharge, patient will complete basic self care tasks  1) Individualized Goal: With Mod Indep for bathroom/hygiene ADL’s  2) Interventions: OT Self Care/ADL, OT Cognitive Skill Dev, OT Manual Ther Technique, OT Neuro Re-Ed/Balance, OT Therapeutic Activity, OT Evaluation and OT Therapeutic Exercise  Outcome: MET Date Met:  06/21/17  Mod Indep to Independent  Goal: LTG-By discharge, patient will perform bathroom transfers  1) Individualized Goal: with or without AE and Mod Indep  2) Interventions: OT Self Care/ADL, OT Cognitive Skill Dev, OT Manual Ther Technique, OT Neuro Re-Ed/Balance, OT Therapeutic Activity, OT Evaluation and OT Therapeutic Exercise  Outcome: MET Date Met:  06/21/17  Mod Indep to Independent    Problem: IADL’s  Goal: STG-Within one week, patient will utilize washer and dryer  1) Individualized Goal: Sup/SBA for set up, completion of task and clean up.  2) Interventions: OT Self Care/ADL, OT Cognitive Skill Dev, OT Neuro Re-Ed/Balance, OT Therapeutic Activity, OT Evaluation and OT Therapeutic Exercise  Outcome: DISCHARGED-GOAL NOT MET Date Met:  06/21/17  Activity incomplete

## 2017-06-21 NOTE — PROGRESS NOTES
Received shift report and assumed care of patient.  Patient sleeping with calm, unlabored respirations, father in room, currently positioned in bed for comfort and safety; call light within reach.  Plan to be discharged to home today. Will continue to monitor with hourly and PRN rounding.

## 2017-06-21 NOTE — CARE PLAN
Problem: Communication  Goal: The ability to communicate needs accurately and effectively will improve  Outcome: PROGRESSING AS EXPECTED  Pt. able to express needs appropriately. Encouraged patient to ask questions or concerns, patient agreeable.      Problem: Infection  Goal: Will remain free from infection  Outcome: PROGRESSING AS EXPECTED  Pt's head incision looks well approximated, no redness or drainage noted. Pt. is afebrile.

## 2017-06-21 NOTE — REHAB-NURSING IDT TEAM NOTE
Nursing  Nursing  Diet/Nutrition:  Regular and Thin Liquids  Medication Administration:  Whole with Liquid Wash  % consumed at meals in last 24 hours:  Consumed 100% of meals per documentation.  Breakfast:  100%   Lunch:  100%  Dinner:  100%   Snack schedule:  HS cereal and milk. (Pt. Consume it at 0630)  Appetite:  Excellent  Fluid Intake/Output in past 24 hours: In: 660 [P.O.:660]  Out: -   Admit Weight:  Weight: 52.1 kg (114 lb 13.8 oz)  Weight Last 7 Days   [55.2 kg (121 lb 11.1 oz)] 55.2 kg (121 lb 11.1 oz) (06/18 0900)    Pain Issues:    Location:  --  --         Severity:  Denies   Scheduled pain medications:  None     PRN pain medications used in last 24 hours:  None   Non Pharmacologic Interventions:  distraction, emotional support, environmental changes and relaxation  Effectiveness of pain management plan:  good=patient states acceptable comfort after interventions    Bowel:    Bowel Assist Initial Score:  5 - Standby Prompting/Supervision or Set-up  Bowel Assist Current Score:  7 - Independent  Bowl Accidents in last 7 days:  0  Last bowel movement: 06/20/17  Stool: Large, Soft, Brown     Usual bowel pattern:  daily  Scheduled bowel medications:  docusate sodium (COLACE) and senna-docusate (PERICOLACE or SENOKOT S)   PRN bowel medications used in last 24 hours:  None  Nursing Interventions:  PRN medication  Effectiveness of bowel program:   good=regular, predictable, controlled emptying of bowel  Bladder:    Bladder Assist Initial Score:  5 - Standby Prompting/Supervision or Set-up  Bladder Assist Current Score:  7 - Independent  Bladder Accidents in last 7 days:  0  PVR range for past 24-48 hours: --  Medications affecting bladder:  None    Time void schedule/voiding pattern:  Time void every 3 hours during the day and every 4 hours at night  Interventions:  Increased time  Effectiveness of bladder training:  Good=regular, predictable, emptying of bladder, patient initiates time voiding    Wound:          Patient Lines/Drains/Airways Status    Active Current Wounds     Name: Placement date: Placement time: Site: Days:    Surgical Incision  Incision Left Head 06/08/17      13                   Interventions:  Monitor for s/s of infection.  Effectiveness of intervention:  wound is improving     Sleep/wake cycle:   Average hours slept:  Sleeps 4-6 hours without waking  Sleep medication usage:  None    Patient/Family Training/Education:  General Self Care, Medication Management and Safety  Discharge Supply Recommendations:  Blood Pressure Monitor and Wound Care Supplies  Strengths: Alert and oriented, Able to follow instructions, Supportive family, Pleasant and cooperative and Effective communication skills   Barriers:   Other:  Hx of seizures            Nursing Problems           Problem: Bowel/Gastric:     Goal: Normal bowel function is maintained or improved (Resolved)         Goal: Will not experience complications related to bowel motility           Problem: Communication     Goal: The ability to communicate needs accurately and effectively will improve           Problem: Discharge Barriers/Planning     Goal: Patient's continuum of care needs will be met           Problem: Infection     Goal: Will remain free from infection           Problem: Knowledge Deficit     Goal: Knowledge of disease process/condition, treatment plan, diagnostic tests, and medications will improve         Goal: Knowledge of the prescribed therapeutic regimen will improve           Problem: Mobility     Goal: Risk for activity intolerance will decrease           Problem: Pain Management     Goal: Pain level will decrease to patient's comfort goal           Problem: Safety     Goal: Will remain free from injury         Goal: Will remain free from falls (Resolved)           Problem: Venous Thromboembolism (VTW)/Deep Vein Thrombosis (DVT) Prevention:     Goal: Patient will participate in Venous Thrombosis (VTE)/Deep Vein Thrombosis  (DVT)Prevention Measures                Long Term Goals:   At discharge patient will be able to function safely at home and in the community with support.    Section completed by:  Jessica Andersen R.N.

## 2017-06-21 NOTE — DISCHARGE INSTRUCTIONS
[facility] NURSING DISCHARGE INSTRUCTIONS    Blood Pressure: 104/59 mmHg  Weight: 55.2 kg (121 lb 11.1 oz)  Nursing recommendations for eMir Byrd at time of discharge are as follows:  Client verbalized understanding of all discharge instructions and prescriptions.     Review all your home medications and newly ordered medications with your doctor and/or pharmacist. Follow medication instructions as directed by your doctor and/or pharmacist.    Pain Management:   Discharge Pain Medication Instructions:  Comfort Goal: 0, Comfort at Rest, Comfort with Movement, Perform Activity, Sleep Comfortably, Stay Alert  Notify your primary care provider if pain is unrelieved with these measures, if the pain is new, or increased in intensity.    Discharge Skin Characteristics:  Warm, dry, intact  Discharge Skin Exam:  Healing scalp incision with few scabs present    Skin / Wound Care Instructions: Please contact your primary care physician for any change in skin integrity.  If You Have Surgical Incisions / Wounds:  Monitor surgical site(s) for signs of increased swelling, redness or symptoms of drainage from the site or fever as this could indicate signs and symptoms of infection. If these symptoms are noted, notifiy your primary care provider.      Discharge Safety Instructions: Should Have ADULT SUPERVISION     Do not participate in vigorous physical activity or play sports until cleared by Dr Larry.  Do not run/jog, jump rope, ride a bicycle, roller skate, skate board, do push ups, calisthenics, or other activities that could add stress to youyr head incision.    Discharge Safety Concerns: Other (Comments), Weakness (Hx of seizures)  The interdisciplinary team has made recommendation that you should have adult supervision in the house due to weakness and Hx of seizures.  Anti-embolic stockings are not required to increase circulation to the lower extremities.    Discharge Diet: Regular     Discharge  Liquids: Thin Liquids  Discharge Bowel Function: Continent  Please contact your primary care physician for any changes in bowel habits.  Discharge Bowel Program:    Discharge Bladder Function: Continent  Discharge Urinary Devices:        Nursing Discharge Plan:   Influenza Vaccine Indication: Not indicated: Previously immunized this influenza season and > 8 years of age    Case Management Discharge Instructions:   Discharge Location: Home with Outpatient Services  Agency Name/Address/Phone: [facility] [phone].   [facility] [address] [place]  [address].  Please call to schedule follow up appointments with Physical Therapy and Speech Therapy.    Home Health:    Outpatient Services:    DME Provider/Phone: None.    Medical Equipment Ordered:    Prescription Faxed to: [facility] [address]         Suicidal Feelings: How to Help Yourself  Suicide is the taking of one's own life. If you feel as though life is getting too tough to handle and are thinking about suicide, get help right away. To get help:  · Call your local emergency services (911 in the [place]  · Call a suicide hotline to speak with a trained counselor who understands how you are feeling. The following is a list of suicide hotlines in the [place]. For a list of hotlines in [place], visit www.suicide.org/hotlines/international/utbwzz-vhcvolk-uzdkerma.html.  ·  1-440-132-TALK (1-560.684.9328).  ·  9-854-QUAADHS (1-986.458.3878).  ·  1-600.546.5221. This is a hotline for Occitan speakers.  ·  3-542-627-4TTY (1-382.936.6309). This is a hotline for TTY users.  ·  3-569-4-U-ERIKA (1-256.731.6937). This is a hotline for lesbian, bolanos, bisexual, transgender, or questioning youth.  · Contact a crisis center or a local suicide prevention center. To find a crisis center or suicide prevention center:  · Call your local hospital, clinic, community service organization, mental health center, social  service provider, or health department. Ask for assistance in connecting to a crisis center.  · Visit www.suicidepreventionlifeline.org/getinvolved/ for a list of crisis centers in the [place], or visit www.suicideprevention.ca/jypfxjkm-nzrcq-lviorrq/find-a-crisis-centre for a list of centers in [place].  · Visit the following websites:  ·  National Suicide Prevention Lifeline: www.suicidepreventionlifeline.org  ·  Hopeline: www.hopeline.Parsely  ·  American Foundation for Suicide Prevention: www.afsp.org  ·  The [employer] Project (for lesbian, bolanos, bisexual, transgender, or questioning youth): www.thetrevorproject.org  HOW CAN I HELP MYSELF FEEL BETTER?  · Promise yourself that you will not do anything drastic when you have suicidal feelings. Remember, there is hope. Many people have gotten through suicidal thoughts and feelings, and you will, too. You may have gotten through them before, and this proves that you can get through them again.  · Let family, friends, teachers, or counselors know how you are feeling. Try not to isolate yourself from those who care about you. Remember, they will want to help you. Talk with someone every day, even if you do not feel sociable. Face-to-face conversation is best.  · Call a mental health professional and see one regularly.  · Visit your primary health care provider every year.  · Eat a well-balanced diet, and space your meals so you eat regularly.  · Get plenty of rest.  · Avoid alcohol and drugs, and remove them from your home. They will only make you feel worse.  · If you are thinking of taking a lot of medicine, give your medicine to someone who can give it to you one day at a time. If you are on antidepressants and are concerned you will overdose, let your health care provider know so he or she can give you safer medicines. Ask your mental health professional about the possible side effects of any medicines you are taking.  · Remove weapons, poisons, knives, and  anything else that could harm you from your home.  · Try to stick to routines. Follow a schedule every day. Put self-care on your schedule.  · Make a list of realistic goals, and cross them off when you achieve them. Accomplishments give a sense of worth.  · Wait until you are feeling better before doing the things you find difficult or unpleasant.  · Exercise if you are able. You will feel better if you exercise for even a half hour each day.  · Go out in the sun or into nature. This will help you recover from depression faster. If you have a favorite place to walk, go there.  · Do the things that have always given you pleasure. Play your favorite music, read a good book, paint a picture, play your favorite instrument, or do anything else that takes your mind off your depression if it is safe to do.  · Keep your living space well lit.  · When you are feeling well, write yourself a letter about tips and support that you can read when you are not feeling well.  · Remember that life's difficulties can be sorted out with help. Conditions can be treated. You can work on thoughts and strategies that serve you well.     This information is not intended to replace advice given to you by your health care provider. Make sure you discuss any questions you have with your health care provider.     Seizure, Adult  A seizure is abnormal electrical activity in the brain. Seizures usually last from 30 seconds to 2 minutes. There are various types of seizures.  Before a seizure, you may have a warning sensation (aura) that a seizure is about to occur. An aura may include the following symptoms:   · Fear or anxiety.  · Nausea.  · Feeling like the room is spinning (vertigo).  · Vision changes, such as seeing flashing lights or spots.  Common symptoms during a seizure include:  · A change in attention or behavior (altered mental status).  · Convulsions with rhythmic jerking movements.  · Drooling.  · Rapid eye  movements.  · Grunting.  · Loss of bladder and bowel control.  · Bitter taste in the mouth.  · Tongue biting.  After a seizure, you may feel confused and sleepy. You may also have an injury resulting from convulsions during the seizure.  HOME CARE INSTRUCTIONS   · If you are given medicines, take them exactly as prescribed by your health care provider.  · Keep all follow-up appointments as directed by your health care provider.  · Do not swim or drive or engage in risky activity during which a seizure could cause further injury to you or others until your health care provider says it is OK.  · Get adequate rest.  · Teach friends and family what to do if you have a seizure. They should:  · Lay you on the ground to prevent a fall.  · Put a cushion under your head.  · Loosen any tight clothing around your neck.  ·   · Turn you on your side. If vomiting occurs, this helps keep your airway clear.  · Stay with you until you recover.  · Know whether or not you need emergency care.  SEEK IMMEDIATE MEDICAL CARE IF:  · The seizure lasts longer than 5 minutes.  · The seizure is severe or you do not wake up immediately after the seizure.  · You have an altered mental status after the seizure.  · You are having more frequent or worsening seizures.  Someone should drive you to the emergency department or call local emergency services (911 in [place]  MAKE SURE YOU:  · Understand these instructions.  · Will watch your condition.  · Will get help right away if you are not doing well or get worse.     This information is not intended to replace advice given to you by your health care provider. Make sure you discuss any questions you have with your health care provider.    Head Injury, Adult  You have a head injury. Headaches and throwing up (vomiting) are common after a head injury. It should be easy to wake up from sleeping. Sometimes you must stay in the hospital. Most problems happen within the first 24 hours. Side effects may  occur up to 7-10 days after the injury.   WHAT ARE THE TYPES OF HEAD INJURIES?  Head injuries can be as minor as a bump. Some head injuries can be more severe. More severe head injuries include:  · A jarring injury to the brain (concussion).  · A bruise of the brain (contusion). This mean there is bleeding in the brain that can cause swelling.  · A cracked skull (skull fracture).  · Bleeding in the brain that collects, clots, and forms a bump (hematoma).  WHEN SHOULD I GET HELP RIGHT AWAY?   · You are confused or sleepy.  · You cannot be woken up.  · You feel sick to your stomach (nauseous) or keep throwing up (vomiting).  · Your dizziness or unsteadiness is getting worse.  · You have very bad, lasting headaches that are not helped by medicine. Take medicines only as told by your doctor.  · You cannot use your arms or legs like normal.  · You cannot walk.  · You notice changes in the black spots in the center of the colored part of your eye (pupil).  · You have clear or bloody fluid coming from your nose or ears.  · You have trouble seeing.  During the next 24 hours after the injury, you must stay with someone who can watch you. This person should get help right away (call 911 in the [place] if you start to shake and are not able to control it (have seizures), you pass out, or you are unable to wake up.  HOW CAN I PREVENT A HEAD INJURY IN THE FUTURE?  · Wear seat belts.  · Wear a helmet while bike riding and playing sports like football.  · Stay away from dangerous activities around the house.  WHEN CAN I RETURN TO NORMAL ACTIVITIES AND ATHLETICS?  See your doctor before doing these activities. You should not do normal activities or play contact sports until 1 week after the following symptoms have stopped:  · Headache that does not go away.  · Dizziness.  · Poor attention.  · Confusion.  · Memory problems.  · Sickness to your stomach or throwing up.  · Tiredness.  · Fussiness.  · Bothered by bright lights or loud  noises.  · Anxiousness or depression.  · Restless sleep.  MAKE SURE YOU:   · Understand these instructions.  · Will watch your condition.  · Will get help right away if you are not doing well or get worse.     This information is not intended to replace advice given to you by your health care provider. Make sure you discuss any questions you have with your health care provider.     Document Released: 11/30/2009 Document Revised: 01/08/2016 Document Reviewed: 08/25/2014  [employer] Interactive Patient Education ©2016 [employer]        Fall Prevention in the Home     Falls can cause injuries. They can happen to people of all ages. There are many things you can do to make your home safe and to help prevent falls.   WHAT CAN I DO ON THE OUTSIDE OF MY HOME?  · Regularly fix the edges of walkways and driveways and fix any cracks.  · Remove anything that might make you trip as you walk through a door, such as a raised step or threshold.  · Trim any bushes or trees on the path to your home.  · Use bright outdoor lighting.  · Clear any walking paths of anything that might make someone trip, such as rocks or tools.  · Regularly check to see if handrails are loose or broken. Make sure that both sides of any steps have handrails.  · Any raised decks and porches should have guardrails on the edges.  · Have any leaves, snow, or ice cleared regularly.  · Use sand or salt on walking paths during winter.  · Clean up any spills in your garage right away. This includes oil or grease spills.  WHAT CAN I DO IN THE BATHROOM?   · Use night lights.  · Install grab bars by the toilet and in the tub and shower. Do not use towel bars as grab bars.  · Use non-skid mats or decals in the tub or shower.  · If you need to sit down in the shower, use a plastic, non-slip stool.  · Keep the floor dry. Clean up any water that spills on the floor as soon as it happens.  · Remove soap buildup in the tub or shower regularly.  · Attach bath mats securely  with double-sided non-slip rug tape.  · Do not have throw rugs and other things on the floor that can make you trip.  WHAT CAN I DO IN THE BEDROOM?  · Use night lights.  · Make sure that you have a light by your bed that is easy to reach.  · Do not use any sheets or blankets that are too big for your bed. They should not hang down onto the floor.  · Have a firm chair that has side arms. You can use this for support while you get dressed.  · Do not have throw rugs and other things on the floor that can make you trip.  WHAT CAN I DO IN THE KITCHEN?  · Clean up any spills right away.  · Avoid walking on wet floors.  · Keep items that you use a lot in easy-to-reach places.  · If you need to reach something above you, use a strong step stool that has a grab bar.  · Keep electrical cords out of the way.  · Do not use floor polish or wax that makes floors slippery. If you must use wax, use non-skid floor wax.  · Do not have throw rugs and other things on the floor that can make you trip.  WHAT CAN I DO WITH MY STAIRS?  · Do not leave any items on the stairs.  · Make sure that there are handrails on both sides of the stairs and use them. Fix handrails that are broken or loose. Make sure that handrails are as long as the stairways.  · Check any carpeting to make sure that it is firmly attached to the stairs. Fix any carpet that is loose or worn.  · Avoid having throw rugs at the top or bottom of the stairs. If you do have throw rugs, attach them to the floor with carpet tape.  · Make sure that you have a light switch at the top of the stairs and the bottom of the stairs. If you do not have them, ask someone to add them for you.  WHAT ELSE CAN I DO TO HELP PREVENT FALLS?  · Wear shoes that:  ¨ Do not have high heels.  ¨ Have rubber bottoms.  ¨ Are comfortable and fit you well.  ¨ Are closed at the toe. Do not wear sandals.  · If you use a stepladder:  ¨ Make sure that it is fully opened. Do not climb a closed  stepladder.  ¨ Make sure that both sides of the stepladder are locked into place.  ¨ Ask someone to hold it for you, if possible.  · Clearly comfort and make sure that you can see:  ¨ Any grab bars or handrails.  ¨ First and last steps.  ¨ Where the edge of each step is.  · Use tools that help you move around (mobility aids) if they are needed. These include:  ¨ Canes.  ¨ Walkers.  ¨ Scooters.  ¨ Crutches.  · Turn on the lights when you go into a dark area. Replace any light bulbs as soon as they burn out.  · Set up your furniture so you have a clear path. Avoid moving your furniture around.  · If any of your floors are uneven, fix them.  · If there are any pets around you, be aware of where they are.  · Review your medicines with your doctor. Some medicines can make you feel dizzy. This can increase your chance of falling.  Ask your doctor what other things that you can do to help prevent falls.     This information is not intended to replace advice given to you by your health care provider. Make sure you discuss any questions you have with your health care provider.          Infection Control in the Home  If you have an infection or you are taking care of someone who has an infection, it is important to know how to keep the infection from spreading. Follow these guidelines to help stop the spread of infection, and talk to your health care provider.  HOW ARE INFECTIONS SPREAD?  In order for an infection to spread, the following must be present:  · A germ. This may be a virus, bacteria, fungus, or parasite.  · A place for the germ to live. This may be:  · On or in a person, animal, plant, or food.  · In soil or water.  · On surfaces, such as a door handle.  · A susceptible host. This is a person or animal who does not have resistance (immunity) to the germ.  · A way for the germ to enter the host. This may occur by:  · Direct contact. This may happen by making contact--such as shaking hands or hugging--with an infected  person or animal. Some germs can also travel through the air and spread to you if an infected person coughs or sneezes on you or near you.  · Indirect contact. This is when the germ enters the host through contact with an infected object. Examples include eating contaminated food, drinking contaminated water, or touching a contaminated surface with your hands and then touching your face, nose, or mouth soon after that.  HOW CAN I HELP TO PREVENT INFECTION FROM SPREADING?  There are several things that you can do to help prevent infection from spreading.  Hand Washing  It is very important to wash your hands correctly, following these steps:  · Wet your hands with clean, running water.  · Apply soap to your hands. Liquid soap is better than bar soap.  · Rub your hands together quickly to create lather.  · Keep rubbing your hands together for at least 20 seconds. Thoroughly scrub all parts of your hands, including under your fingernails and between your fingers.  · Rinse your hands with clean, running water until all of the soap is gone.  · Dry your hands with an air dryer or a clean paper or cloth towel, or let your hands air-dry. Do not use your clothing or a soiled towel to dry your hands.  · If you are in a public restroom, use your towel to turn off the water faucet and to open the bathroom door.  Make sure to wash your hands:  · Before:  · Visiting a baby or anyone with a weakened or lowered defense (immune) system.  · Putting in and taking out any contact lenses.  · After:  · Working or playing outside.  · Touching an animal or its toys or leash.  · Handling livestock.  · Using the bathroom or helping a child or adult to use the bathroom.  · Using household  or toxic chemicals.  · Touching or taking out the garbage.  · Touching anything dirty around your home.  · Handling soiled clothes or rags.  · Taking care of a sick child. This includes touching used tissues, toys, and clothes.  · Sneezing,  coughing, or blowing your nose.  · Using public transportation.  · Shaking hands.  · Using a phone, including your mobile phone.  · Touching money.  · Before and after:  · Preparing food.  · Preparing a bottle for a baby.  · Feeding a baby or a young child.  · Eating.  · Visiting or taking care of someone who is sick.  · Changing a diaper.  · Changing a bandage (dressing) or taking care of an injury or wound.  · Giving or taking medicine.  Taking Care of Your Home  · Make sure that you have enough cleaning supplies at all times. These include:  · Disinfectants.  · Reusable cleaning cloths. Wash these after each use.  · Paper towels.  · Utility gloves. Replace your gloves if they are cracked or torn or if they start to peel.  · Use bleach safely. Never mix it with other cleaning products, especially those that contain ammonia. This mixture can create a dangerous gas that may be deadly.  · Take care of your cleaning supplies. Toilet brushes, mops, and sponges can breed germs. Soak them in bleach and water for 5 minutes after each use.  · Do not pour used mop water down the sink. Pour it down the toilet instead.  · Maintain proper ventilation in your home.  · If you have a pet, ensure that your pet stays clean. Do not let people with weak immune systems touch bird droppings, fish tank water, or a litter box.  · If you have a cat, be sure to change the litter every day.  · In the bathroom, make sure you:  · Provide liquid soap.  · Change towels and washcloths frequently. Avoid sharing towels and washcloths.  · Change toothbrushes often and store them separately in a clean, dry place.  · Disinfect the toilet.  · Clean the tub, shower, and sink with standard cleaning products.    · Mop the floor with a standard .  · Do not share personal items, such as razors, toothbrushes, drinking glasses, deodorant, rob, brushes, towels, and washcloths.    · In the kitchen, make sure you:  · Store food  carefully.  · Refrigerate leftovers promptly in covered containers.  · Throw out stale or spoiled food.  · Clean the inside of your refrigerator each week.  · Keep your refrigerator set at 40°F (4°C) or less, and set your freezer at 0°F (-18°C) or less.  · Thaw foods in the refrigerator or microwave, not at room temperature.  · Serve foods at the proper temperature. Do not eat raw meat. Make sure it is cooked to the appropriate temperature. Cook eggs until they are firm.  · Wash fruits and vegetables under running water.  · Use separate cutting boards, plates, and utensils for raw foods and cooked foods.  · Keep work surfaces clean.  · Use a clean spoon each time you sample food while cooking.  · Wash your dishes in hot, soapy water. Air-dry your dishes or use a .  · Do not share forks, cups, or spoons during meals.  · Wear gloves if laundry is visibly soiled.  · Change linens each week or whenever they are soiled.  · Do not shake soiled linens. Doing that may send germs into the air. Put dressings, sanitary or incontinence pads, diapers, and gloves in plastic garbage bags for disposal.     This information is not intended to replace advice given to you by your health care provider. Make sure you discuss any questions you have with your health care provider.         Physical Therapy Discharge Instructions for Meir Ledezmajackie  6/21/2017    Level of Assist Required for Ambulation: No Assist on Flat Surfaces, No Assist on Curbs, No Assist on Stairs (Supervision for Safety with streets and community ambulation)  Distance Patient May Ambulate: 1500+ feet  Device Recommended for Ambulation: None  Level of Assist Required for Transfers: Requires No Assist  Device Recommended for Transfers: None  Home Exercise Program: None Issued  Prosthesis / Orthosis Recommendation / Location: No Prosthesis or Orthosis Recommended    Meir, it has been a pleasure working with you over the past couple weeks.  Please continue with your  outpatient physical therapy for increasing balance and activities that may increase the forces and/or blood pressure in your head.  You have done a great job and we will miss you!  --Ace, PT        Speech Therapy Discharge Instructions for Meir Byrd    6/21/2017    Diet: Regular - all foods allowed, Thin Liquids - any liquid like water, coffee, tea, juices, or clear fluids like Gatorade, etc.  Swallow Strategies: N/A  Other Diet Instructions: N/A  Supervision: No supervision is required at time of d/c.   Cognition / Communication: Pt has demonstrated good progress while at [facility]. Pt has increased both expressive and receptive skills dramatically throughout his stay. Pt comprehends information with supervision, benefits from breakdown of complex information. Pt is able to express his overall wants and needs with minimal assistance to supervision (cues for use of word finding strategies ex - describing semantic features). Pt has learned a variety of strategies to assist with word finding difficulties such as semantic features (what does it look like, what does it do/what do we do with it, where can you find it, what is it made of etc.), taking his time to gather his thought prior to verbalizing intended message, and to maintain eye contact while talking to that of his listener.  Pt continues to demonstrate mild cognitive deficits in the areas of working memory and attention to details/complex planning and organization tasks. A homework program has been provided to pt at the time of d/c to address both cognitive and speech skills.     It has been a pleasure working with you and your family. Take care and be safe!     Isaura Moran M.S., CCC-SLP  Occupational Therapy Discharge Instructions for Meir Byrd    6/21/2017    Level of Assist Required for Eating: Able to Complete Eating without Assist  Level of Assist Required for Grooming: Able to Complete Grooming without Assist  Level of Assist Required for Dressing:  Able to Complete Dressing without Assist  Level of Assist Required for Toileting: Able to Complete Toileting without Assist  Level of Assist Required for Toilet Transfer: Able to Complete Toilet Transfer without Assist  Equipment for Toilet Transfer: Raised Toilet Seat with Arms  Level of Assist Required for Bathing: Able to Complete Bathing without Assist  Equipment for Bathing: Shower Chair, Hand Held Shower Head, Long Handled Sponge  Level of Assist Required for Shower Transfer: Able to Complete Shower Transfer without Assist  Equipment for Shower Transfer: Grab Bars in Tub / Shower, Shower Chair  Driving: May not Drive, Please Contact Physician for Further Information  Home Exercise Program: Refer to Home Exercise Program Handout for Details

## 2017-06-21 NOTE — PROGRESS NOTES
Patient discharged to home per order.  Reviewed all discharge instructions, appointments, discharge medications, and wound care instructions with patient and father; they verbalize understanding.  Education provided in discharge instructions about seizures, infection, suicide, depression and Home Safety and Fall Prevention. Discharge paperwork completed; signed copies in chart.  Patient has education binder and all belongings; signed copy in chart.  Pt alert, calm, stable; no change in status from morning assessment.  Patient left facility at 1300 via ambulatory accompanied by father ; escorted to car by staff.  Have enjoyed working with this pleasant patient.

## 2017-06-21 NOTE — DISCHARGE PLANNING
I reviewed all case management dc instructions with pt's father, Don and pt.   They are both in agreement.   No further intervention.

## 2017-06-22 NOTE — CARE PLAN
Problem: Balance  Goal: STG-Within one week, patient will maintain dynamic standing  1) Individualized goal: Low fall risk on Rodriguez or DGI balance assessment  2) Interventions: PT Gait Training, PT Therapeutic Exercises, PT Neuro Re-Ed/Balance, PT Therapeutic Activity and PT Manual Therapy.   Outcome: MET Date Met:  06/22/17    Problem: Mobility  Goal: STG-Within one week, patient will ambulate community distances  1) Individualized goal: SPV outdoors and Mod I indoors x1000 feet without LOB, no AD.  2) Interventions: PT Gait Training, PT Therapeutic Exercises, PT Neuro Re-Ed/Balance, PT Therapeutic Activity and PT Manual Therapy.    Outcome: MET Date Met:  06/22/17  Goal: STG-Within one week, patient will ambulate up/down flight of stairs  1) Individualized goal: SPV x16 stairs with 1 railing  2) Interventions: PT Gait Training, PT Therapeutic Exercises, PT Neuro Re-Ed/Balance, PT Therapeutic Activity and PT Manual Therapy.    Outcome: MET Date Met:  06/22/17    Problem: Mobility Transfers  Goal: STG-Within one week, patient will transfer bed to chair  1) Individualized goal: IND without LOB.  2) Interventions: PT Gait Training, PT Therapeutic Exercises, PT Neuro Re-Ed/Balance, PT Therapeutic Activity and PT Manual Therapy.    Outcome: MET Date Met:  06/22/17    Problem: PT-Long Term Goals  Goal: LTG-By discharge, patient will transfer one surface to another  1) Individualized goal: Mod I without LOB.  2) Interventions: PT Gait Training, PT Therapeutic Exercises, PT Neuro Re-Ed/Balance, PT Therapeutic Activity and PT Manual Therapy.  Outcome: MET Date Met:  06/22/17  Goal: LTG-By discharge, patient will ambulate up/down flight of stairs  1) Individualized goal: SPV with 1 railing  2) Interventions: PT Gait Training, PT Therapeutic Exercises, PT Neuro Re-Ed/Balance, PT Therapeutic Activity and PT Manual Therapy.  Outcome: MET Date Met:  06/22/17  Goal: LTG-By discharge, patient will ambulate  1) Individualized goal:  SPV outdoors and Mod I indoors x1000 feet without LOB.  2) Interventions: PT Gait Training, PT Therapeutic Exercises, PT Neuro Re-Ed/Balance, PT Therapeutic Activity and PT Manual Therapy.  Outcome: MET Date Met:  06/22/17

## 2017-06-24 ENCOUNTER — HOSPITAL ENCOUNTER (OUTPATIENT)
Dept: RADIOLOGY | Facility: MEDICAL CENTER | Age: 22
End: 2017-06-24
Attending: NEUROLOGICAL SURGERY
Payer: COMMERCIAL

## 2017-06-24 DIAGNOSIS — S09.90XA HEAD INJURY, UNSPECIFIED: ICD-10-CM

## 2017-06-24 PROCEDURE — 70450 CT HEAD/BRAIN W/O DYE: CPT

## 2017-07-04 NOTE — ADDENDUM NOTE
Encounter addended by: Porfirio Fernandes M.D. on: 7/3/2017 11:24 PM<BR>     Documentation filed: Notes Section

## 2017-07-04 NOTE — PROGRESS NOTES
"Rehab Progress Note     Interval Events (Subjective)  Patient was seen in his room today. He is doing very well. He has no complaints with a long conversation about medication management. He is now on a regular diet      Objective:  VITAL SIGNS: /76 mmHg  Pulse 91  Temp(Src) 36.6 °C (97.9 °F)  Resp 18  Ht 1.689 m (5' 6.5\")  Wt 55.2 kg (121 lb 11.1 oz)  BMI 19.35 kg/m2  SpO2 96%      Cardiac: regular rate and rhythm, nl S1/S2    Lungs: clear to auscultation bilaterally.    Abdomen: soft; NT, ND, BS present.    Extremities: Without clubbing cyanosis or edema  Neuro improved cognition. Balance and overall function significantly improved. Language remains his major issue expressive > than receptive      Current Facility-Administered Medications    Medication  Frequency    •  levetiracetam (KEPPRA) tablet 1,000 mg  Q12HRS    •  divalproex (DEPAKOTE) delayed-release tablet 500 mg  QHS    •  oxycodone-acetaminophen (PERCOCET-10)  MG per tablet 1 Tab  Q6HRS PRN    •  trazodone (DESYREL) tablet 50 mg  QHS PRN    •  Respiratory Care per Protocol  Continuous RT    •  acetaminophen (TYLENOL) tablet 650 mg  Q4HRS PRN    •  ondansetron (ZOFRAN ODT) dispertab 4 mg  4X/DAY PRN      Or    •  ondansetron (ZOFRAN) syringe/vial injection 4 mg  4X/DAY PRN    •  docusate sodium (COLACE) capsule 100 mg  DAILY      And    •  senna-docusate (PERICOLACE or SENOKOT S) 8.6-50 MG per tablet 1 Tab  Q EVENING      And    •  lactulose 20 GM/30ML solution 30 mL  PRN      And    •  bisacodyl (DULCOLAX) suppository 10 mg  PRN      And    •  fleet enema 133 mL  PRN    •  simethicone (MYLICON) chewable tab 80 mg  TID PRN          Orders Placed This Encounter    Procedures    •  DIET ORDER        Standing Status:  Standing          Number of Occurrences:  1          Standing Expiration Date:          Order Specific Question:   Diet:        Answer:   Regular [1]        Order Specific Question:   Consistency/Fluid modifications:        " Answer:   Thin Liquids [3]              Assessment:  Active Hospital Problems    Diagnosis   • *Ischemic stroke (CMS-[facility])   • History of traumatic brain injury   • Anomia   • Aphasia   • Impaired mobility and ADLs   • Cognitive and behavioral changes   • Epidural hematoma (CMS-[facility])       Medical Decision Making and Plan:    Doing well  Will continue PT, OT and SLP  Greatest deficits are in language and cognition  Tentative discharge date set for 6/24/2017  Will reconference next week 6/21/2017    His progress progress remains excellent this time          Total time: > 25  minutes.  I spent greater than 50% of the time for patient care and coordination on this date, including unit/floor time, and face-to-face time with the patient as per assessment and plan above.    Porfirio Fernandes M.D.

## 2017-07-05 NOTE — DISCHARGE SUMMARY
Rehab Discharge Note    Admission Diagnosis:   Active Hospital Problems    Diagnosis   • *Ischemic stroke (CMS-HCC)   • History of traumatic brain injury   • Anomia   • Aphasia   • Impaired mobility and ADLs   • Cognitive and behavioral changes   • Epidural hematoma (CMS-HCC)       Discharge Diagnosis:  Active Hospital Problems    Diagnosis   • *Ischemic stroke (CMS-HCC)   • History of traumatic brain injury   • Anomia   • Aphasia   • Impaired mobility and ADLs   • Cognitive and behavioral changes   • Epidural hematoma (CMS-HCC)       Hospital Course      HPI:  The patient is a 21 y.o. male with a past medical history of severe traumatic brain injury at age 11; now admitted for acute inpatient rehabilitation with severe functional debility after a cranioplasty which is complicated by a documented epidural hematoma and per report of the father and ischemic stroke.  On admission the patient and medical record report that the patient had a severe traumatic brain injury was 11 years old had a very good recovery was fully independent doing quite well overall per report of the father the patient was attending college doing quite well. Per report of the father who is the primary steroids since the son is a phasic and only limited medical records are here for my review the patient was admitted to [facility] on May 24, 2017 to undergo a cranioplasty both cause while his skull is grown as can be expected the cranioplasty did not. Per report of the father the patient had a complicated course had significant bleeding was was difficult getting the cranioplasty removed as it was hearing to the dura. Again per report of the father the patient required 2 units of blood his course was further complicated by an epidural hematoma as well one the medical records and I reviewed per the per report of the father the patient had a stroke of the ischemic variety after surgery.. Other issues while in the acute care  hospital and development of the seizures for which the patient was placed on Keppra as well as valproic acid The patient presents with dysphagia and aphasia and a right hemiparesis since he was in his premorbid level of function patient has been referred to rehabilitation and therefore admitted to our facility        Patient was evaluated by Rehab Medicine physician and Physical Therapy, Occupational Therapy and Speech Therapy and determined to be appropriate for acute inpatient rehab and was transferred to [facility] on 2017    The patient had no acute medical issues while an inpatient in our facilityseizures his biggest issue on admission were primarily cognitive and language based and he improved dramatically as documented below. He progressed to modified independent status passively expected him to and was discharged to home with family 2017    A long conversation with the patient and his father regarding the issue of anticonvulsant uses him suggesting follow-up with an epileptologist after discharge. tthe risk and benefits of weaning off on anticonvulsants were discussed at length and she had a EEG and follow-up because it's certainly possible that anticonvulsants can impair recovery starting his decision that has been made after full knowledge of all issues        Functional Status at Discharge  Eatin - Independent  Eating Description:   (Mod Indep w/ container management, scoop with spoon from bowl, hand to mouth to self feed w/ no spillage)  Groomin - Independent  Grooming Description:   (standing at sink for groom/hygiene routine)  Bathin - Modified Independent  Bathing Description:  Grab bar, Tub bench  Upper Body Dressin - Modified Independent  Upper Body Dressing Description:   (button up open shirt)  Lower Body Dressin - Modified Independent  Lower Body Dressing Description:  6 - Modified Independent  Discharge Location : Home  Patient Discharging  with Assist of: Family   Level of Supervision Required: Intermittent Supervision  Recommended Services Upon Discharge: No Follow-Up Occupational Therapy Recommended  Long Term Goals Met: 2  Long Term Goals Not Met: 0  Reason(s) for Goals Not Met: NA  Criteria for Termination of Services: Maximum Function Achieved for Inpatient Rehabilitation  Comments: Meir, It's been a pleasure working with you as you progressed through your therapy.  You've made significant gains and I urge you to continue to be aware of the environment and safety concerns , continue with your Fine Motor exercises to increase your right fine motor dexterity and coorination to facillitate greater return to your previous level of function.  Thank you once again and I wish you the best .  Sincerely, Frederick - OT  Walk:  7 - Independent  Distance Walked:  Walks a minimum of 150 feet  Walk Description:   (IND indoors/outdoors, no AD, did not attempt crossing streets and intersections today; 1x over 2000 feet without fatigue; occasionally uses wide CARLOS)  Wheelchair:     Distance Propelled:      Wheelchair Description:     Stairs 7 - Independent  Stairs Description (16 stairs, no AD, safe reciprocal usage)  Discharge Location: Home  Patient Discharging with Assist of: Family  Level of Supervision Required Upon Discharge: Intermittent Supervision  Recommended Equipment for Discharge: None  Recommeded Services Upon Discharge: Outpatient Physical Therapy  Long Term Goals Met: all  Long Term Goals Not Met: none  Reason(s) for Goals Not Met: n/a  Criteria for Termination of Services: Maximum Function Achieved for Inpatient Rehabilitation  Comments: Patient will benefit from outpatient PT to continue age-appropriate levels of balance, safety, and pt's tolerance to activities that increase intra-cranial pressure.  Comprehension Mode:  Both  Comprehension:  5 - Stand-by Prompting/Supervision or Set-up  Comprehension Description:  Verbal cues  Expression Mode:   Both  Expression:  4 - Minimal Assistance  Expression Description:  Verbal cueing  Social Interaction:  6 - Modified Independent  Social Interaction Description:  Verbal cues  Problem Solvin - Minimal Assistance  Problem Solving Description:  Verbal cueing  Memory:  5 - Standby Prompting/Supervision or Set-up  Memory Description:  Verbal cueing, Therapy schedule  Progress since Admit: Good progress has been made since admit.   Discharge Location : Home  Patient Discharging with Assist of: Family   Level of Supervision Required: No Supervision  Recommended Services Upon Discharge: Outpatient Speech Therapy  Long Term Goals Met: 1/2  Long Term Goals Not Met: 1/2  Reason(s) for Goals Not Met: Pt requires minimal assistance to express wants and needs at the time of d/c due to expressive aphasia and word finding difficulties.   Criteria for Termination of Services: Maximum Function Achieved for Inpatient Rehabilitation    Discharge Medication:     Medication List      START taking these medications       Instructions    divalproex 500 MG Tbec   Last time this was given:  500 mg on 2017  8:01 PM   Commonly known as:  DEPAKOTE   Replaces:  Divalproex Sodium 125 MG Csdr   Next Dose Due:  2017 at bedtime    Take 1 Tab by mouth every bedtime.   Dose:  500 mg       levetiracetam 1000 MG tablet   Last time this was given:  1,000 mg on 2017  8:43 AM   Commonly known as:  KEPPRA   Replaces:  levetiracetam 100 MG/ML Soln   Next Dose Due:  2017 at bedtime    Take 1 Tab by mouth every 12 hours.   Dose:  1000 mg         STOP taking these medications          Divalproex Sodium 125 MG Csdr   Commonly known as:  DEPAKOTE   Replaced by:  divalproex 500 MG Tbec       levetiracetam 100 MG/ML Soln   Commonly known as:  KEPPRA   Replaced by:  levetiracetam 1000 MG tablet             Equipment:  None    Follow-up:  Discharge Date:  2017.     Discharge Location  Home with  "family along with outpatient therapy for speech therapy and physical therapy.         Outpatient Services  [facility]   [address] [place]  [address]  Please call to schedule out patient speech and physical therapy .     Medical Equipment Ordered  None needed.        Prescription Electronically    sent to:   [facility] [place]           Follow-up With  Why  Contact Info    Dr. Francisco Larry,    Neurosurgeon  6/22/2017 Thursday @ 1:00pm.   [address] [facility] [place] [address]  [phone]      Dr. James Peguero,    Primary Care  7/10/2017 Monday @ 9:50am. Records will be sent to [facility] primary care's office.   [address] [place] [address] [phone]       Dr. Aaron White,    Neurologist    Please choose either Neurologist or Epileptologist for follow up as listed below.     [address]  [place] [address]  [phone]    Dr. Brain Valdes,  Epileptologist     [address] [facility] [place] [address] [phone]    Dr. Nguyen    Epileptologist     [address] [place] [address].  [place], [place] [address] [phone]                 Condition on Discharge:  Good condition    /76 mmHg  Pulse 91  Temp(Src) 36.6 °C (97.9 °F)  Resp 18  Ht 1.689 m (5' 6.5\")  Wt 55.2 kg (121 lb 11.1 oz)  BMI 19.35 kg/m2  SpO2 96%     Cardiac: regular rate and rhythm, nl S1/S2    Lungs: clear to auscultation bilaterally.    Abdomen: soft; NT, ND, BS present.    Extremities: Without clubbing cyanosis or edema  Neuro improved cognition mobility and performance of ADLS. Expressive language is more impaired than  receptive. Both substantially improved since admission    Greater than 40 minutes were spent in total in the preparation of the patient's discharge. This included clinical coordination. reconciliation of medications and preparation of this report      Porfirio Fernandes M.D."

## 2017-07-05 NOTE — PROGRESS NOTES
"Rehab Progress Note     Interval Events (Subjective)  Seen in his room today. Doing quite well overall. Language and cognition remain biggest area of deficits. Greatly improved Notes of the therapist appreciated     Objective:  VITAL SIGNS: /76 mmHg  Pulse 91  Temp(Src) 36.6 °C (97.9 °F)  Resp 18  Ht 1.689 m (5' 6.5\")  Wt 55.2 kg (121 lb 11.1 oz)  BMI 19.35 kg/m2  SpO2 96%  Cardiac: regular rate and rhythm, nl S1/S2    Lungs: clear to auscultation bilaterally.    Abdomen: soft; NT, ND, BS present.    Extremities: Without clubbing cyanosis or edema  Neuro improved cognition mobility and performance of ADLS. Expressive language is more impaired than  receptive. Both substantially improved since admission No results found for this or any previous visit (from the past 72 hour(s)).      Current Facility-Administered Medications     Medication   Frequency     •   levetiracetam (KEPPRA) tablet 1,000 mg   Q12HRS     •   divalproex (DEPAKOTE) delayed-release tablet 500 mg   QHS     •   oxycodone-acetaminophen (PERCOCET-10)  MG per tablet 1 Tab   Q6HRS PRN     •   trazodone (DESYREL) tablet 50 mg   QHS PRN     •   Respiratory Care per Protocol   Continuous RT     •   acetaminophen (TYLENOL) tablet 650 mg   Q4HRS PRN     •   ondansetron (ZOFRAN ODT) dispertab 4 mg   4X/DAY PRN        Or     •   ondansetron (ZOFRAN) syringe/vial injection 4 mg   4X/DAY PRN     •   docusate sodium (COLACE) capsule 100 mg   DAILY        And     •   senna-docusate (PERICOLACE or SENOKOT S) 8.6-50 MG per tablet 1 Tab   Q EVENING        And     •   lactulose 20 GM/30ML solution 30 mL   PRN        And     •   bisacodyl (DULCOLAX) suppository 10 mg   PRN        And     •   fleet enema 133 mL   PRN     •   simethicone (MYLICON) chewable tab 80 mg   TID PRN           Orders Placed This Encounter     Procedures     •   DIET ORDER           Standing Status:   Standing              Number of Occurrences:   1              Standing " Expiration Date:              Order Specific Question:    Diet:           Answer:    Regular [1]           Order Specific Question:    Consistency/Fluid modifications:           Answer:    Thin Liquids [3]                     Assessment:  Active Hospital Problems    Diagnosis   • *Ischemic stroke (CMS-HCC)   • History of traumatic brain injury   • Anomia   • Aphasia   • Impaired mobility and ADLs   • Cognitive and behavioral changes   • Epidural hematoma (CMS-HCC)       Medical Decision Making and Plan:  Patient  Is making excellent progress  Continue PT, OT and SLP  We may have to revisit a discharge diagnosis progress is really quite good at this time he is near modified in most ADLs and mobility    Total time:  > 25 minutes.  I spent greater than 50% of the time for patient care and coordination on this date, including unit/floor time, and face-to-face time with the patient as per assessment and plan above.    Porfirio Fernandes M.D.

## 2017-07-05 NOTE — PROGRESS NOTES
"Rehab Progress Note     Interval Events (Subjective)  Seen in his room today. Doing quite well overall. His insurance company is raised the question appropriately and if he is mod I for ADLs mobility can be managed as an outpatient and I agree with the     Objective:  VITAL SIGNS: /76 mmHg  Pulse 91  Temp(Src) 36.6 °C (97.9 °F)  Resp 18  Ht 1.689 m (5' 6.5\")  Wt 55.2 kg (121 lb 11.1 oz)  BMI 19.35 kg/m2  SpO2 96%  Cardiac: regular rate and rhythm, nl S1/S2    Lungs: clear to auscultation bilaterally.    Abdomen: soft; NT, ND, BS present.    Extremities: Without clubbing cyanosis or edema  Neuro improved cognition mobility and performance of ADLS. Expressive language is more impaired than  receptive. Both substantially improved since admission         No results found for this or any previous visit (from the past 72 hour(s)).      Current Facility-Administered Medications     Medication   Frequency     •   levetiracetam (KEPPRA) tablet 1,000 mg   Q12HRS     •   divalproex (DEPAKOTE) delayed-release tablet 500 mg   QHS     •   oxycodone-acetaminophen (PERCOCET-10)  MG per tablet 1 Tab   Q6HRS PRN     •   trazodone (DESYREL) tablet 50 mg   QHS PRN     •   Respiratory Care per Protocol   Continuous RT     •   acetaminophen (TYLENOL) tablet 650 mg   Q4HRS PRN     •   ondansetron (ZOFRAN ODT) dispertab 4 mg   4X/DAY PRN        Or     •   ondansetron (ZOFRAN) syringe/vial injection 4 mg   4X/DAY PRN     •   docusate sodium (COLACE) capsule 100 mg   DAILY        And     •   senna-docusate (PERICOLACE or SENOKOT S) 8.6-50 MG per tablet 1 Tab   Q EVENING        And     •   lactulose 20 GM/30ML solution 30 mL   PRN        And     •   bisacodyl (DULCOLAX) suppository 10 mg   PRN        And     •   fleet enema 133 mL   PRN     •   simethicone (MYLICON) chewable tab 80 mg   TID PRN           Orders Placed This Encounter     Procedures     •   DIET ORDER           Standing Status:   Standing              Number " of Occurrences:   1              Standing Expiration Date:              Order Specific Question:    Diet:           Answer:    Regular [1]           Order Specific Question:    Consistency/Fluid modifications:           Answer:    Thin Liquids [3]                     Assessment:  Active Hospital Problems    Diagnosis   • *Ischemic stroke (CMS-HCC)   • History of traumatic brain injury   • Anomia   • Aphasia   • Impaired mobility and ADLs   • Cognitive and behavioral changes   • Epidural hematoma (CMS-HCC)       Medical Decision Making and Plan:  Patient has made excellent progress   We are asked by the insurance company if the patient can move on to outpatient therapy at this time and I believe he can. I discussed this at length with the patient and his father  We discuss potential for moving his anticonvulsants in the future I recommend follow-up with a epileptologist regarding this issue  For discharge tomorrow    Total time:  > 25 minutes.  I spent greater than 50% of the time for patient care and coordination on this date, including unit/floor time, and face-to-face time with the patient as per assessment and plan above.    Porfirio Fernandes M.D.

## 2017-07-05 NOTE — ADDENDUM NOTE
Encounter addended by: Porfirio Fernandes M.D. on: 7/5/2017  8:43 AM<BR>     Documentation filed: Notes Section

## 2025-06-03 NOTE — CARE PLAN
Patient checking status on message.  Please call.  Thank you.   Problem: Safety  Goal: Will remain free from injury  Outcome: PROGRESSING AS EXPECTED  Pt. Is Mod.I in the room, assisted during his shower before bedtime for safety, pt.'s mother at bedside stayed all night. Hourly rounding continue for safety.    Problem: Pain Management  Goal: Pain level will decrease to patient’s comfort goal  Outcome: PROGRESSING AS EXPECTED  Pt. Denies pain, resting comfortably. Comfort food (cereal) provided and pt. Is happy. Continue monitor condition, seizure precaution measures observe.